# Patient Record
Sex: FEMALE | Race: WHITE | NOT HISPANIC OR LATINO | Employment: OTHER | URBAN - METROPOLITAN AREA
[De-identification: names, ages, dates, MRNs, and addresses within clinical notes are randomized per-mention and may not be internally consistent; named-entity substitution may affect disease eponyms.]

---

## 2017-06-01 ENCOUNTER — PROCEDURE VISIT (OUTPATIENT)
Dept: CARDIOLOGY | Facility: HOSPITAL | Age: 62
End: 2017-06-01

## 2017-06-01 ENCOUNTER — APPOINTMENT (OUTPATIENT)
Dept: LAB | Facility: HOSPITAL | Age: 62
End: 2017-06-01

## 2017-06-01 ENCOUNTER — OFFICE VISIT (OUTPATIENT)
Dept: CARDIOLOGY | Facility: HOSPITAL | Age: 62
End: 2017-06-01

## 2017-06-01 VITALS
WEIGHT: 173.4 LBS | HEIGHT: 65 IN | BODY MASS INDEX: 28.89 KG/M2 | SYSTOLIC BLOOD PRESSURE: 182 MMHG | HEART RATE: 79 BPM | OXYGEN SATURATION: 94 % | RESPIRATION RATE: 16 BRPM | DIASTOLIC BLOOD PRESSURE: 77 MMHG | TEMPERATURE: 98.4 F

## 2017-06-01 DIAGNOSIS — I48.0 PAROXYSMAL ATRIAL FIBRILLATION (HCC): Primary | ICD-10-CM

## 2017-06-01 DIAGNOSIS — I49.3 PVC (PREMATURE VENTRICULAR CONTRACTION): ICD-10-CM

## 2017-06-01 DIAGNOSIS — I49.9 IRREGULAR HEART RATE: ICD-10-CM

## 2017-06-01 DIAGNOSIS — R07.9 CHEST PAIN, UNSPECIFIED: ICD-10-CM

## 2017-06-01 DIAGNOSIS — R10.13 DYSPEPSIA: ICD-10-CM

## 2017-06-01 LAB
ALBUMIN SERPL-MCNC: 4.6 G/DL (ref 3.2–4.8)
ALBUMIN/GLOB SERPL: 1.5 G/DL (ref 1.5–2.5)
ALP SERPL-CCNC: 105 U/L (ref 25–100)
ALT SERPL W P-5'-P-CCNC: 22 U/L (ref 7–40)
ANION GAP SERPL CALCULATED.3IONS-SCNC: 4 MMOL/L (ref 3–11)
AST SERPL-CCNC: 21 U/L (ref 0–33)
BILIRUB SERPL-MCNC: 0.4 MG/DL (ref 0.3–1.2)
BUN BLD-MCNC: 14 MG/DL (ref 9–23)
BUN/CREAT SERPL: 17.5 (ref 7–25)
CALCIUM SPEC-SCNC: 10.2 MG/DL (ref 8.7–10.4)
CHLORIDE SERPL-SCNC: 108 MMOL/L (ref 99–109)
CO2 SERPL-SCNC: 29 MMOL/L (ref 20–31)
CREAT BLD-MCNC: 0.8 MG/DL (ref 0.6–1.3)
DEPRECATED RDW RBC AUTO: 41.2 FL (ref 37–54)
ERYTHROCYTE [DISTWIDTH] IN BLOOD BY AUTOMATED COUNT: 12.1 % (ref 11.3–14.5)
GFR SERPL CREATININE-BSD FRML MDRD: 72 ML/MIN/1.73
GLOBULIN UR ELPH-MCNC: 3.1 GM/DL
GLUCOSE BLD-MCNC: 93 MG/DL (ref 70–100)
HCT VFR BLD AUTO: 39.5 % (ref 34.5–44)
HGB BLD-MCNC: 13.3 G/DL (ref 11.5–15.5)
MAGNESIUM SERPL-MCNC: 2.5 MG/DL (ref 1.3–2.7)
MCH RBC QN AUTO: 31.4 PG (ref 27–31)
MCHC RBC AUTO-ENTMCNC: 33.7 G/DL (ref 32–36)
MCV RBC AUTO: 93.2 FL (ref 80–99)
PLATELET # BLD AUTO: 304 10*3/MM3 (ref 150–450)
PMV BLD AUTO: 10.9 FL (ref 6–12)
POTASSIUM BLD-SCNC: 4.7 MMOL/L (ref 3.5–5.5)
PROT SERPL-MCNC: 7.7 G/DL (ref 5.7–8.2)
RBC # BLD AUTO: 4.24 10*6/MM3 (ref 3.89–5.14)
SODIUM BLD-SCNC: 141 MMOL/L (ref 132–146)
T4 FREE SERPL-MCNC: 1.08 NG/DL (ref 0.89–1.76)
TSH SERPL DL<=0.05 MIU/L-ACNC: 2.91 MIU/ML (ref 0.35–5.35)
WBC NRBC COR # BLD: 8.92 10*3/MM3 (ref 3.5–10.8)

## 2017-06-01 PROCEDURE — 80053 COMPREHEN METABOLIC PANEL: CPT | Performed by: NURSE PRACTITIONER

## 2017-06-01 PROCEDURE — 36415 COLL VENOUS BLD VENIPUNCTURE: CPT | Performed by: NURSE PRACTITIONER

## 2017-06-01 PROCEDURE — 84443 ASSAY THYROID STIM HORMONE: CPT | Performed by: NURSE PRACTITIONER

## 2017-06-01 PROCEDURE — 84439 ASSAY OF FREE THYROXINE: CPT | Performed by: NURSE PRACTITIONER

## 2017-06-01 PROCEDURE — 85027 COMPLETE CBC AUTOMATED: CPT | Performed by: NURSE PRACTITIONER

## 2017-06-01 PROCEDURE — 83735 ASSAY OF MAGNESIUM: CPT | Performed by: NURSE PRACTITIONER

## 2017-06-01 PROCEDURE — 99204 OFFICE O/P NEW MOD 45 MIN: CPT | Performed by: NURSE PRACTITIONER

## 2017-06-01 PROCEDURE — 93010 ELECTROCARDIOGRAM REPORT: CPT | Performed by: INTERNAL MEDICINE

## 2017-06-01 PROCEDURE — 93005 ELECTROCARDIOGRAM TRACING: CPT

## 2017-06-01 RX ORDER — ESOMEPRAZOLE MAGNESIUM 40 MG/1
40 CAPSULE, DELAYED RELEASE ORAL DAILY PRN
COMMUNITY
End: 2018-10-18 | Stop reason: ALTCHOICE

## 2017-06-01 RX ORDER — PROPRANOLOL HYDROCHLORIDE 20 MG/1
20 TABLET ORAL DAILY
COMMUNITY
Start: 2017-05-20 | End: 2017-06-01

## 2017-06-01 RX ORDER — IBUPROFEN 800 MG/1
800 TABLET ORAL 2 TIMES DAILY PRN
COMMUNITY

## 2017-06-01 RX ORDER — ASPIRIN 81 MG/1
81 TABLET ORAL DAILY
COMMUNITY
End: 2017-06-01

## 2017-06-01 NOTE — PROGRESS NOTES
[facility]  [facility]      Encounter Date:06/01/2017     Muriel Rainey  [address] [facility] [address]  [phone]    1955    EARLENE Vega    Muriel Rainey is a 62 y.o. female.      Subjective:     Chief Complaint:  Palpitations (New dx afib with frequent PVCs) and Chest Pain       Palpitations    This is a new problem. The current episode started 1 to 4 weeks ago. The problem occurs daily. The problem has been gradually worsening. On average, each episode lasts 15 minutes. Nothing (occurs at rest and exertion) aggravates the symptoms. Associated symptoms include chest pain, an irregular heartbeat, malaise/fatigue and weakness. Pertinent negatives include no anxiety, coughing, diaphoresis, dizziness, fever, nausea, near-syncope, shortness of breath, syncope or vomiting. She has tried beta blockers for the symptoms. The treatment provided no relief. Risk factors include family history. Her past medical history is significant for hyperthyroidism. There is no history of anemia, anxiety, drug use, heart disease or a valve disorder.   Chest Pain    This is a new problem. The current episode started 1 to 4 weeks ago. The onset quality is undetermined. The problem occurs intermittently. The problem has been gradually worsening. The pain is present in the lateral region. The pain is mild. The quality of the pain is described as tightness. The pain radiates to the mid back and left neck. Associated symptoms include abdominal pain (worsening heartburn over the last 3 weeks.  ), irregular heartbeat, malaise/fatigue, palpitations and weakness. Pertinent negatives include no claudication, cough, diaphoresis, dizziness, fever, headaches, hemoptysis, nausea, near-syncope, orthopnea, PND, shortness of breath, sputum production, syncope or vomiting. The pain is aggravated by exertion and food. She has tried antacids for the symptoms. The treatment provided mild relief.   Her past medical  history is significant for arrhythmia (newly diagnosed).   Pertinent negatives for past medical history include no anxiety/panic attacks, no CAD, no COPD, no drug use, no DVT, no heart disease, no hypertension, no MI, no muscle weakness, no PE, no strokes, no TIA and no valve disorder.   Her family medical history is significant for CAD.        Problem   Chest Pain, Unspecified   Paroxysmal Atrial Fibrillation    · 24-hour Holter monitor 5/22/2017: Paroxysmal atrial fibrillation and frequent PVCs     Pvc (Premature Ventricular Contraction)   Dyspepsia       Past Surgical History:   Procedure Laterality Date   • CHOLECYSTECTOMY  11/25016   • ROTATOR CUFF REPAIR Right 2016       Allergies   Allergen Reactions   • Gabapentin Mental Status Change   • Statins Myalgia         Current Outpatient Prescriptions:   •  esomeprazole (nexIUM) 40 MG capsule, Take 40 mg by mouth Daily As Needed., Disp: , Rfl:   •  ibuprofen (ADVIL,MOTRIN) 800 MG tablet, Take 800 mg by mouth 2 (Two) Times a Day As Needed., Disp: , Rfl:   ASA 81 mg daily  . Propranolol 20 mg daily    The following portions of the patient's history were reviewed and updated as appropriate: allergies, current medications, past family history, past medical history, past social history, past surgical history and problem list.    Review of Systems   Constitution: Positive for weakness and malaise/fatigue. Negative for chills, decreased appetite, diaphoresis, fever, night sweats, weight gain and weight loss.   HENT: Negative for congestion, headaches and nosebleeds.    Eyes: Negative for blurred vision, visual disturbance and visual halos.   Cardiovascular: Positive for chest pain, irregular heartbeat and palpitations. Negative for claudication, cyanosis, dyspnea on exertion, leg swelling, near-syncope, orthopnea, paroxysmal nocturnal dyspnea and syncope.   Respiratory: Negative for cough, hemoptysis, shortness of breath, sleep disturbances due to breathing, snoring,  "sputum production and wheezing.    Endocrine: Negative for cold intolerance, heat intolerance, polydipsia, polyphagia and polyuria.   Hematologic/Lymphatic: Does not bruise/bleed easily.   Skin: Negative for dry skin, itching and rash.   Musculoskeletal: Positive for joint pain. Negative for falls, joint swelling, muscle weakness and myalgias.   Gastrointestinal: Positive for abdominal pain (worsening heartburn over the last 3 weeks.  ), diarrhea and heartburn. Negative for bloating, constipation, dysphagia, melena, nausea and vomiting.   Genitourinary: Positive for nocturia. Negative for dysuria, flank pain and hematuria.   Neurological: Positive for excessive daytime sleepiness. Negative for difficulty with concentration, dizziness and loss of balance.   Psychiatric/Behavioral: Negative for altered mental status and depression. The patient is not nervous/anxious.    Allergic/Immunologic: Negative for environmental allergies.       Objective:     Vitals:    06/01/17 1509 06/01/17 1516 06/01/17 1517   BP: 146/66 154/71 (!) 182/77   BP Location: Right arm Left arm Left arm   Patient Position: Sitting Sitting Standing   Pulse: 79     Resp: 16     Temp: 98.4 °F (36.9 °C)     TempSrc: Temporal Artery      SpO2: 94%     Weight: 173 lb 6.4 oz (78.7 kg)     Height: 65\" (165.1 cm)           Physical Exam   Constitutional: She is oriented to person, place, and time. She appears well-developed and well-nourished. No distress.   HENT:   Head: Normocephalic and atraumatic.   Mouth/Throat: Oropharynx is clear and moist.   Eyes: Conjunctivae are normal. Pupils are equal, round, and reactive to light. No scleral icterus.   Neck: No hepatojugular reflux and no JVD present. Carotid bruit is not present. No tracheal deviation present. No thyromegaly present.   Cardiovascular: Normal rate, normal heart sounds and intact distal pulses.  An irregularly irregular rhythm present. Exam reveals no friction rub.    No murmur " heard.  Pulmonary/Chest: Effort normal and breath sounds normal.   Abdominal: Soft. Bowel sounds are normal. She exhibits no distension. There is no tenderness.   Musculoskeletal: She exhibits no edema.   Lymphadenopathy:     She has no cervical adenopathy.   Neurological: She is alert and oriented to person, place, and time.   Skin: Skin is warm, dry and intact. No rash noted. No cyanosis or erythema. No pallor.   Psychiatric: She has a normal mood and affect. Her behavior is normal. Thought content normal.   Vitals reviewed.      Lab and Diagnostic Review:  Results for orders placed or performed in visit on 06/01/17   Comprehensive Metabolic Panel   Result Value Ref Range    Glucose 93 70 - 100 mg/dL    BUN 14 9 - 23 mg/dL    Creatinine 0.80 0.60 - 1.30 mg/dL    Sodium 141 132 - 146 mmol/L    Potassium 4.7 3.5 - 5.5 mmol/L    Chloride 108 99 - 109 mmol/L    CO2 29.0 20.0 - 31.0 mmol/L    Calcium 10.2 8.7 - 10.4 mg/dL    Total Protein 7.7 5.7 - 8.2 g/dL    Albumin 4.60 3.20 - 4.80 g/dL    ALT (SGPT) 22 7 - 40 U/L    AST (SGOT) 21 0 - 33 U/L    Alkaline Phosphatase 105 (H) 25 - 100 U/L    Total Bilirubin 0.4 0.3 - 1.2 mg/dL    eGFR Non African Amer 72 >60 mL/min/1.73    Globulin 3.1 gm/dL    A/G Ratio 1.5 1.5 - 2.5 g/dL    BUN/Creatinine Ratio 17.5 7.0 - 25.0    Anion Gap 4.0 3.0 - 11.0 mmol/L   CBC (No Diff)   Result Value Ref Range    WBC 8.92 3.50 - 10.80 10*3/mm3    RBC 4.24 3.89 - 5.14 10*6/mm3    Hemoglobin 13.3 11.5 - 15.5 g/dL    Hematocrit 39.5 34.5 - 44.0 %    MCV 93.2 80.0 - 99.0 fL    MCH 31.4 (H) 27.0 - 31.0 pg    MCHC 33.7 32.0 - 36.0 g/dL    RDW 12.1 11.3 - 14.5 %    RDW-SD 41.2 37.0 - 54.0 fl    MPV 10.9 6.0 - 12.0 fL    Platelets 304 150 - 450 10*3/mm3   TSH   Result Value Ref Range    TSH 2.913 0.350 - 5.350 mIU/mL   T4, Free   Result Value Ref Range    Free T4 1.08 0.89 - 1.76 ng/dL   Magnesium   Result Value Ref Range    Magnesium 2.5 1.3 - 2.7 mg/dL       Assessment and Plan:         1.  Paroxysmal atrial fibrillation    - ECG 12 Lead; Sinus rhythm with frequent PVCs, 76 bpm, bigeminal PVCs    Chadsvasc 1 (female)  - apixaban (ELIQUIS) 5 MG tablet tablet; Take 1 tablet by mouth 2 (Two) Times a Day.  Dispense: 60 tablet; Refill: 3    D/c propranolol and start:  - metoprolol tartrate (LOPRESSOR) 25 MG tablet; Take 1 tablet by mouth 2 (Two) Times a Day.  Dispense: 60 tablet; Refill: 3    - Adult Transthoracic Echo Complete; today, results pending    - Comprehensive Metabolic Panel  - CBC (No Diff)  - TSH  - T4, Free  - Magnesium    A. fib education completed: What is atrial fibrillation, causes, triggers, signs and symptoms, medication management (rate control versus rhythm control) and stroke prevention, procedural management and indications, and the role of the atrial fibrillation center and when to call.    2. PVC (premature ventricular contraction)  Bigeminal PVCs on EKG today.  Holter monitor showing frequent PVCs      3. Chest pain, unspecified  - Stress Test With Myocardial Perfusion (1 Day);   With AFib and PVC.  I want pt to continue BB, hold night dose prior to stress testing.  Lexiscan will be ordered.    4.  Dyspepsia:  Take Nexium daily  Cardiology referrral.    F[facility]u [facility], pending testing results and POC    *Please note that portions of this note were completed with a voice recognition program. Efforts were made to edit the dictations, but occasionally words are mistranscribed.

## 2017-06-02 ENCOUNTER — HOSPITAL ENCOUNTER (OUTPATIENT)
Dept: CARDIOLOGY | Facility: HOSPITAL | Age: 62
Discharge: HOME | End: 2017-06-02
Admitting: NURSE PRACTITIONER

## 2017-06-02 VITALS
HEIGHT: 65 IN | BODY MASS INDEX: 28.82 KG/M2 | WEIGHT: 173 LBS | SYSTOLIC BLOOD PRESSURE: 134 MMHG | DIASTOLIC BLOOD PRESSURE: 80 MMHG

## 2017-06-02 DIAGNOSIS — I48.0 PAROXYSMAL ATRIAL FIBRILLATION (HCC): ICD-10-CM

## 2017-06-02 DIAGNOSIS — I49.3 PVC (PREMATURE VENTRICULAR CONTRACTION): ICD-10-CM

## 2017-06-02 PROBLEM — R07.9 CHEST PAIN, UNSPECIFIED: Status: ACTIVE | Noted: 2017-06-02

## 2017-06-02 LAB
BH CV ECHO MEAS - AO MAX PG (FULL): 6.7 MMHG
BH CV ECHO MEAS - AO MAX PG: 9.9 MMHG
BH CV ECHO MEAS - AO MEAN PG (FULL): 3.5 MMHG
BH CV ECHO MEAS - AO MEAN PG: 5.3 MMHG
BH CV ECHO MEAS - AO ROOT AREA (BSA CORRECTED): 1.3
BH CV ECHO MEAS - AO ROOT AREA: 4.4 CM^2
BH CV ECHO MEAS - AO ROOT DIAM: 2.4 CM
BH CV ECHO MEAS - AO V2 MAX: 157.5 CM/SEC
BH CV ECHO MEAS - AO V2 MEAN: 107.4 CM/SEC
BH CV ECHO MEAS - AO V2 VTI: 38 CM
BH CV ECHO MEAS - AVA(I,A): 1.7 CM^2
BH CV ECHO MEAS - AVA(I,D): 1.7 CM^2
BH CV ECHO MEAS - AVA(V,A): 1.7 CM^2
BH CV ECHO MEAS - AVA(V,D): 1.7 CM^2
BH CV ECHO MEAS - BSA(HAYCOCK): 1.9 M^2
BH CV ECHO MEAS - BSA: 1.8 M^2
BH CV ECHO MEAS - BZI_BMI: 30.6 KILOGRAMS/M^2
BH CV ECHO MEAS - BZI_METRIC_HEIGHT: 160 CM
BH CV ECHO MEAS - BZI_METRIC_WEIGHT: 78.5 KG
BH CV ECHO MEAS - CONTRAST EF (2CH): 62.8 ML/M^2
BH CV ECHO MEAS - CONTRAST EF 4CH: 51.2 ML/M^2
BH CV ECHO MEAS - EDV(CUBED): 140.5 ML
BH CV ECHO MEAS - EDV(MOD-SP2): 78 ML
BH CV ECHO MEAS - EDV(MOD-SP4): 123 ML
BH CV ECHO MEAS - EDV(TEICH): 129.4 ML
BH CV ECHO MEAS - EF(CUBED): 78.4 %
BH CV ECHO MEAS - EF(MOD-SP2): 62.8 %
BH CV ECHO MEAS - EF(TEICH): 70.2 %
BH CV ECHO MEAS - ESV(CUBED): 30.4 ML
BH CV ECHO MEAS - ESV(MOD-SP2): 29 ML
BH CV ECHO MEAS - ESV(MOD-SP4): 60 ML
BH CV ECHO MEAS - ESV(TEICH): 38.6 ML
BH CV ECHO MEAS - FS: 40 %
BH CV ECHO MEAS - IVS/LVPW: 1
BH CV ECHO MEAS - IVSD: 0.77 CM
BH CV ECHO MEAS - LA DIMENSION: 3.5 CM
BH CV ECHO MEAS - LA/AO: 1.5
BH CV ECHO MEAS - LAT PEAK E' VEL: 5.6 CM/SEC
BH CV ECHO MEAS - LV DIASTOLIC VOL/BSA (35-75): 67.6 ML/M^2
BH CV ECHO MEAS - LV MASS(C)D: 138.3 GRAMS
BH CV ECHO MEAS - LV MASS(C)DI: 76.1 GRAMS/M^2
BH CV ECHO MEAS - LV MAX PG: 3.2 MMHG
BH CV ECHO MEAS - LV MEAN PG: 1.7 MMHG
BH CV ECHO MEAS - LV SYSTOLIC VOL/BSA (12-30): 33 ML/M^2
BH CV ECHO MEAS - LV V1 MAX: 89.5 CM/SEC
BH CV ECHO MEAS - LV V1 MEAN: 61 CM/SEC
BH CV ECHO MEAS - LV V1 VTI: 20.8 CM
BH CV ECHO MEAS - LVIDD: 5.2 CM
BH CV ECHO MEAS - LVIDS: 3.1 CM
BH CV ECHO MEAS - LVLD AP2: 7.2 CM
BH CV ECHO MEAS - LVLD AP4: 7.3 CM
BH CV ECHO MEAS - LVLS AP2: 5.4 CM
BH CV ECHO MEAS - LVLS AP4: 6.4 CM
BH CV ECHO MEAS - LVOT AREA (M): 3.1 CM^2
BH CV ECHO MEAS - LVOT AREA: 3.1 CM^2
BH CV ECHO MEAS - LVOT DIAM: 2 CM
BH CV ECHO MEAS - LVPWD: 0.77 CM
BH CV ECHO MEAS - MED PEAK E' VEL: 4.56 CM/SEC
BH CV ECHO MEAS - MV A MAX VEL: 95 CM/SEC
BH CV ECHO MEAS - MV DEC TIME: 0.24 SEC
BH CV ECHO MEAS - MV E MAX VEL: 88.8 CM/SEC
BH CV ECHO MEAS - MV E/A: 0.93
BH CV ECHO MEAS - PA ACC SLOPE: 391.9 CM/SEC^2
BH CV ECHO MEAS - PA ACC TIME: 0.16 SEC
BH CV ECHO MEAS - PA PR(ACCEL): 5.3 MMHG
BH CV ECHO MEAS - PULM DIAS VEL: 43.2 CM/SEC
BH CV ECHO MEAS - PULM S/D: 1.4
BH CV ECHO MEAS - PULM SYS VEL: 60.9 CM/SEC
BH CV ECHO MEAS - RVDD: 2.9 CM
BH CV ECHO MEAS - SI(AO): 92.2 ML/M^2
BH CV ECHO MEAS - SI(CUBED): 60.6 ML/M^2
BH CV ECHO MEAS - SI(LVOT): 35 ML/M^2
BH CV ECHO MEAS - SI(MOD-SP2): 26.9 ML/M^2
BH CV ECHO MEAS - SI(MOD-SP4): 34.6 ML/M^2
BH CV ECHO MEAS - SI(TEICH): 50 ML/M^2
BH CV ECHO MEAS - SV(AO): 167.7 ML
BH CV ECHO MEAS - SV(CUBED): 110.1 ML
BH CV ECHO MEAS - SV(LVOT): 63.7 ML
BH CV ECHO MEAS - SV(MOD-SP2): 49 ML
BH CV ECHO MEAS - SV(MOD-SP4): 63 ML
BH CV ECHO MEAS - SV(TEICH): 90.9 ML
BH CV ECHO MEAS - TAPSE (>1.6): 2.3 CM2
BH CV ECHO MEAS - TR MAX VEL: 200.4 CM/SEC
BH CV VAS BP LEFT ARM: NORMAL MMHG
BH CV XLRA - RV BASE: 3.5 CM
BH CV XLRA - RV LENGTH: 7.3 CM
BH CV XLRA - RV MID: 2.5 CM
BH CV XLRA - TDI S': 10.3 CM/SEC
E/E' RATIO: 17.2
LEFT ATRIUM VOLUME INDEX: 25.3 ML/M2
LV EF 2D ECHO EST: 55 %

## 2017-06-02 PROCEDURE — 93306 TTE W/DOPPLER COMPLETE: CPT | Performed by: INTERNAL MEDICINE

## 2017-06-02 PROCEDURE — 93306 TTE W/DOPPLER COMPLETE: CPT

## 2017-06-05 ENCOUNTER — DOCUMENTATION (OUTPATIENT)
Dept: CARDIOLOGY | Facility: HOSPITAL | Age: 62
End: 2017-06-05

## 2017-06-05 ENCOUNTER — HOSPITAL ENCOUNTER (OUTPATIENT)
Dept: CARDIOLOGY | Facility: HOSPITAL | Age: 62
Discharge: HOME | End: 2017-06-05

## 2017-06-05 ENCOUNTER — HOSPITAL ENCOUNTER (OUTPATIENT)
Dept: CARDIOLOGY | Facility: HOSPITAL | Age: 62
Discharge: HOME | End: 2017-06-05
Admitting: NURSE PRACTITIONER

## 2017-06-05 DIAGNOSIS — R07.9 CHEST PAIN, UNSPECIFIED: ICD-10-CM

## 2017-06-05 DIAGNOSIS — I48.0 PAROXYSMAL ATRIAL FIBRILLATION (HCC): ICD-10-CM

## 2017-06-05 DIAGNOSIS — I49.3 PVC (PREMATURE VENTRICULAR CONTRACTION): ICD-10-CM

## 2017-06-05 LAB
BH CV STRESS BP STAGE 2: NORMAL
BH CV STRESS BP STAGE 4: NORMAL
BH CV STRESS COMMENTS STAGE 1: NORMAL
BH CV STRESS DOSE REGADENOSON STAGE 1: 0.4
BH CV STRESS DURATION MIN STAGE 1: 1
BH CV STRESS DURATION MIN STAGE 2: 1
BH CV STRESS DURATION MIN STAGE 3: 1
BH CV STRESS DURATION MIN STAGE 4: 1
BH CV STRESS DURATION SEC STAGE 1: 0
BH CV STRESS DURATION SEC STAGE 2: 0
BH CV STRESS DURATION SEC STAGE 4: 0
BH CV STRESS HR STAGE 1: 103
BH CV STRESS HR STAGE 2: 120
BH CV STRESS HR STAGE 3: 110
BH CV STRESS HR STAGE 4: 110
BH CV STRESS PROTOCOL 1: NORMAL
BH CV STRESS RECOVERY BP: NORMAL MMHG
BH CV STRESS RECOVERY HR: 99 BPM
BH CV STRESS STAGE 1: 1
BH CV STRESS STAGE 2: 2
BH CV STRESS STAGE 3: 3
BH CV STRESS STAGE 4: 4
LV EF NUC BP: 58 %
MAXIMAL PREDICTED HEART RATE: 157 BPM
PERCENT MAX PREDICTED HR: 78.34 %
STRESS BASELINE BP: NORMAL MMHG
STRESS BASELINE HR: 90 BPM
STRESS PERCENT HR: 92 %
STRESS POST PEAK BP: NORMAL MMHG
STRESS POST PEAK HR: 123 BPM
STRESS TARGET HR: 133 BPM

## 2017-06-05 PROCEDURE — 78452 HT MUSCLE IMAGE SPECT MULT: CPT

## 2017-06-05 PROCEDURE — A9500 TC99M SESTAMIBI: HCPCS | Performed by: NURSE PRACTITIONER

## 2017-06-05 PROCEDURE — 93018 CV STRESS TEST I&R ONLY: CPT | Performed by: INTERNAL MEDICINE

## 2017-06-05 PROCEDURE — 78452 HT MUSCLE IMAGE SPECT MULT: CPT | Performed by: INTERNAL MEDICINE

## 2017-06-05 PROCEDURE — 0 TECHNETIUM SESTAMIBI: Performed by: NURSE PRACTITIONER

## 2017-06-05 PROCEDURE — 25010000002 REGADENOSON 0.4 MG/5ML SOLUTION: Performed by: NURSE PRACTITIONER

## 2017-06-05 PROCEDURE — 93017 CV STRESS TEST TRACING ONLY: CPT

## 2017-06-05 RX ADMIN — Medication 1 DOSE: at 11:40

## 2017-06-05 RX ADMIN — Medication 1 DOSE: at 13:45

## 2017-06-05 RX ADMIN — REGADENOSON 0.4 MG: 0.08 INJECTION, SOLUTION INTRAVENOUS at 13:44

## 2017-06-05 NOTE — PROGRESS NOTES
Received a PA request for apixaban 5 mg BID. Called insurance company and they sent PA form. Filled the form and it was signed EARLENE Barnes. Fax was sent to the insurance company.     Thank you,  Irma Moy PharmD.  6/5/2017  9:27 AM

## 2017-06-16 ENCOUNTER — CONSULT (OUTPATIENT)
Dept: CARDIOLOGY | Facility: CLINIC | Age: 62
End: 2017-06-16

## 2017-06-16 VITALS
WEIGHT: 175.6 LBS | HEIGHT: 65 IN | BODY MASS INDEX: 29.26 KG/M2 | HEART RATE: 54 BPM | SYSTOLIC BLOOD PRESSURE: 122 MMHG | DIASTOLIC BLOOD PRESSURE: 74 MMHG

## 2017-06-16 DIAGNOSIS — R07.89 ATYPICAL CHEST PAIN: Primary | ICD-10-CM

## 2017-06-16 DIAGNOSIS — I48.0 PAROXYSMAL ATRIAL FIBRILLATION (HCC): ICD-10-CM

## 2017-06-16 DIAGNOSIS — I49.3 PVC (PREMATURE VENTRICULAR CONTRACTION): ICD-10-CM

## 2017-06-16 PROCEDURE — 99204 OFFICE O/P NEW MOD 45 MIN: CPT | Performed by: INTERNAL MEDICINE

## 2017-06-16 RX ORDER — MELOXICAM 7.5 MG/1
7.5 TABLET ORAL 2 TIMES DAILY
COMMUNITY
End: 2018-10-18 | Stop reason: ALTCHOICE

## 2017-06-16 NOTE — PROGRESS NOTES
Encounter Date:06/16/2017    Patient ID: Muriel Rainey is a  62 y.o. female who resides in [place], [place].    CC/Reason for visit:  Shortness of Breath (Consult ); Irregular Heart Beat; and Palpitations          Muriel Rainey is a very sweet 62-year-old female referred to the office by EARLENE Mackenzie for evaluation of atrial fibrillation and palpitations.  The patient started having palpitations starting approximately one to 2 months ago.  The palpitations were irregularly occurring, not provoked or relieved with any maneuvers.  She saw her primary care physician and underwent a Holter monitor which demonstrated frequent PVCs and what was reported as short bursts of atrial fibrillation.  The patient was seen in the atrial fibrillation clinic and started on anticoagulation and metoprolol.  She states that the metoprolol is giving her an uneasy feeling in her chest.  She does feel that her palpitations have improved overall however.  She feels that the metoprolol causes some tightness in her chest which occurs at rest.  When she is active gardening in her yard, she has no anginal chest pain symptoms or palpitation symptoms.  She denies TIA or stroke symptoms.  She's had no syncope.    Review of Systems   Constitution: Positive for weight gain. Negative for weakness and malaise/fatigue.   HENT: Positive for headaches.    Eyes: Negative for vision loss in left eye and vision loss in right eye.   Cardiovascular: Positive for chest pain and palpitations. Negative for dyspnea on exertion, near-syncope, orthopnea, paroxysmal nocturnal dyspnea and syncope.   Musculoskeletal: Negative for myalgias.   Gastrointestinal: Positive for heartburn and vomiting. Negative for nausea.   Neurological: Negative for brief paralysis, excessive daytime sleepiness, focal weakness, numbness and paresthesias.   All other systems reviewed and are negative.      The patient's past medical, social, and family history reviewed in the  "patient's electronic medical record.    Allergies  Gabapentin and Statins    Outpatient Prescriptions Marked as Taking for the 6/16/17 encounter (Consult) with Nahid Vaughn MD   Medication Sig Dispense Refill   • esomeprazole (nexIUM) 40 MG capsule Take 40 mg by mouth Daily.     • ibuprofen (ADVIL,MOTRIN) 800 MG tablet Take 800 mg by mouth 2 (Two) Times a Day As Needed.     • meloxicam (MOBIC) 7.5 MG tablet Take 7.5 mg by mouth 2 (Two) Times a Day.     • metoprolol tartrate (LOPRESSOR) 25 MG tablet Take 1 tablet by mouth 2 (Two) Times a Day. 60 tablet 3   • [DISCONTINUED] apixaban (ELIQUIS) 5 MG tablet tablet Take 1 tablet by mouth 2 (Two) Times a Day. 60 tablet 3   • [DISCONTINUED] metoprolol tartrate (LOPRESSOR) 25 MG tablet Take 1 tablet by mouth 2 (Two) Times a Day. 60 tablet 3         Blood pressure 122/74, pulse 54, height 65\" (165.1 cm), weight 175 lb 9.6 oz (79.7 kg).  Body mass index is 29.22 kg/(m^2).    Physical Exam   Constitutional: She is oriented to person, place, and time. She appears well-developed and well-nourished.   HENT:   Head: Normocephalic and atraumatic.   Eyes: Pupils are equal, round, and reactive to light. No scleral icterus.   Neck: No JVD present. No thyromegaly present.   Cardiovascular: Normal rate and regular rhythm.  Exam reveals no gallop.    No murmur heard.  Pulmonary/Chest: Effort normal and breath sounds normal.   Abdominal: Soft. She exhibits no distension.   Musculoskeletal: She exhibits no edema.   Neurological: She is alert and oriented to person, place, and time.   Skin: Skin is warm and dry.   Psychiatric: She has a normal mood and affect. Her behavior is normal.       Data Review:     Lab Results   Component Value Date    TSH 2.913 06/01/2017     Lab Results   Component Value Date    WBC 8.92 06/01/2017    HGB 13.3 06/01/2017    HCT 39.5 06/01/2017    MCV 93.2 06/01/2017     06/01/2017     Lab Results   Component Value Date    GLUCOSE 93 06/01/2017 "    BUN 14 06/01/2017    CREATININE 0.80 06/01/2017    EGFRIFNONA 72 06/01/2017    BCR 17.5 06/01/2017    K 4.7 06/01/2017    CO2 29.0 06/01/2017    CALCIUM 10.2 06/01/2017    ALBUMIN 4.60 06/01/2017    LABIL2 1.5 06/01/2017    AST 21 06/01/2017    ALT 22 06/01/2017       Procedures    I reviewed the tracings from the 24 hour Holter monitor.  While the image quality is not great, I am not convinced that the short bursts of SVT are atrial fibrillation.       Problem List Items Addressed This Visit        Cardiovascular and Mediastinum    Paroxysmal atrial fibrillation    Overview     · 24-hour Holter monitor (5/22/2017): Paroxysmal atrial fibrillation and frequent PVCs  · Echo (6/2/2017): EF 55%. Mild TR.  · Pharmacologic nuclear stress (6/5/2017): EF 58%.  Normal perfusion.  Frequent PVCs noted.  · Chads VASC = 1         Current Assessment & Plan     I am not convinced that the patient has atrial fibrillation based on the scanned  Holter monitor results in epic.  Furthermore, she has a chads VASC score of 1 indicated aspirin monotherapy would be reasonable.  Suspect her palpitations are more related to the PVCs she was experiencing.  Her symptoms have improved and EKG today shows no PVCs.    · 14 day Holter monitor to evaluate for atrial fibrillation and evaluate PVC burden.  · Discontinue Eliquis and start aspirin 81 mg daily  · Continue metoprolol         Relevant Medications    aspirin 81 MG tablet    metoprolol tartrate (LOPRESSOR) 25 MG tablet    Other Relevant Orders    Holter / Event ZIO Patch    PVC (premature ventricular contraction)    Relevant Medications    metoprolol tartrate (LOPRESSOR) 25 MG tablet    Other Relevant Orders    Holter / Event ZIO Patch       Nervous and Auditory    Atypical chest pain - Primary    Relevant Medications    aspirin 81 MG tablet               · 14 day event monitor  · Discontinue Eliquis  · Start aspirin 81 mg daily  · Continue metoprolol  · Follow-up with me in 4  weeks    Nahid Vaughn MD  6/16/2017     Scribed for Nahid Vaughn MD by Jan Melton. 6/16/2017  5:06 PM    I, Nahid Vaughn MD, personally performed the services described in this documentation as scribed by the above named individual in my presence, and it is both accurate and complete.  6/16/2017  5:06 PM

## 2017-06-16 NOTE — ASSESSMENT & PLAN NOTE
I am not convinced that the patient has atrial fibrillation based on the scanned  Holter monitor results in epic.  Furthermore, she has a chads VASC score of 1 indicated aspirin monotherapy would be reasonable.  Suspect her palpitations are more related to the PVCs she was experiencing.  Her symptoms have improved.    · 14 day Holter monitor to evaluate for atrial fibrillation and evaluate PVC burden.  · Discontinue Eliquis and start aspirin 81 mg daily  · Continue metoprolol

## 2017-07-26 ENCOUNTER — OFFICE VISIT (OUTPATIENT)
Dept: CARDIOLOGY | Facility: CLINIC | Age: 62
End: 2017-07-26

## 2017-07-26 VITALS
SYSTOLIC BLOOD PRESSURE: 120 MMHG | HEART RATE: 54 BPM | HEIGHT: 65 IN | DIASTOLIC BLOOD PRESSURE: 84 MMHG | WEIGHT: 176.8 LBS | BODY MASS INDEX: 29.46 KG/M2

## 2017-07-26 DIAGNOSIS — I48.0 PAROXYSMAL ATRIAL FIBRILLATION (HCC): ICD-10-CM

## 2017-07-26 DIAGNOSIS — I49.3 PVC (PREMATURE VENTRICULAR CONTRACTION): Primary | ICD-10-CM

## 2017-07-26 DIAGNOSIS — R07.89 ATYPICAL CHEST PAIN: ICD-10-CM

## 2017-07-26 PROCEDURE — 99213 OFFICE O/P EST LOW 20 MIN: CPT | Performed by: NURSE PRACTITIONER

## 2017-07-26 RX ORDER — PREDNISONE 10 MG/1
10 TABLET ORAL DAILY
COMMUNITY
End: 2017-08-18

## 2017-07-26 RX ORDER — FLECAINIDE ACETATE 50 MG/1
50 TABLET ORAL EVERY 12 HOURS
Qty: 180 TABLET | Refills: 0 | Status: SHIPPED | OUTPATIENT
Start: 2017-07-26 | End: 2017-07-27 | Stop reason: ALTCHOICE

## 2017-07-26 NOTE — ASSESSMENT & PLAN NOTE
· Discontinue metoprolol and start flecainide 50 mg twice a day starting Saturday  · Repeat EKG at [facility] on Monday  · Continue aspirin 81 mg daily

## 2017-07-26 NOTE — PROGRESS NOTES
"Encounter Date:07/26/2017    Patient ID: Muriel Rainey is a 62 y.o. female who resides in [place], [place] but often stays in [place] with one of her 4 daughters.    CC/Reason for visit:  Shortness of Breath (Follow up ); Irregular Heart Beat; and Palpitations          Muriel Rainey returns today for follow-up of her palpitations area at her last visit.  His last visit she wore a 2 week event monitor which confirmed that she is not having atrial fibrillation but rather having frequent PVCs.  Her PVC burden was 19.6% of the time despite being on 25 twice a day of metoprolol.  She admits to feeling much better but it does still feel the palpitations.  She has experienced no more episodes of chest pain but does notice when she drinks her cup of coffee in the morning she will fill her heart \"skipped beats\".  She denies dyspnea, orthopnea or syncope.    Review of Systems   Cardiovascular: Positive for palpitations.       The patient's past medical, social, and family history reviewed in the patient's electronic medical record.    Allergies  Gabapentin and Statins    Outpatient Prescriptions Marked as Taking for the 7/26/17 encounter (Office Visit) with Nahid Vaughn MD   Medication Sig Dispense Refill   • aspirin 81 MG tablet Take 1 tablet by mouth Daily for 360 days. 90 tablet 3   • esomeprazole (nexIUM) 40 MG capsule Take 40 mg by mouth Daily.     • ibuprofen (ADVIL,MOTRIN) 800 MG tablet Take 800 mg by mouth 2 (Two) Times a Day As Needed.     • meloxicam (MOBIC) 7.5 MG tablet Take 7.5 mg by mouth 2 (Two) Times a Day.     • predniSONE (DELTASONE) 10 MG tablet Take 10 mg by mouth Daily.     • [DISCONTINUED] metoprolol tartrate (LOPRESSOR) 25 MG tablet Take 1 tablet by mouth 2 (Two) Times a Day. 60 tablet 3         Blood pressure 120/84, pulse 54, height 65\" (165.1 cm), weight 176 lb 12.8 oz (80.2 kg).  Body mass index is 29.42 kg/(m^2).    Physical Exam   Constitutional: She is oriented to person, place, and " time. She appears well-developed and well-nourished.   HENT:   Head: Normocephalic and atraumatic.   Eyes: Pupils are equal, round, and reactive to light. No scleral icterus.   Neck: No JVD present. Carotid bruit is not present. No thyromegaly present.   Cardiovascular: Normal rate, regular rhythm, S1 normal and S2 normal.  Exam reveals no gallop.    No murmur heard.  Pulmonary/Chest: Effort normal and breath sounds normal.   Abdominal: Soft. There is no tenderness.   Neurological: She is alert and oriented to person, place, and time.   Skin: Skin is warm and dry. No cyanosis. Nails show no clubbing.   Psychiatric: She has a normal mood and affect. Her behavior is normal.       Data Review:   Procedures         Problem List Items Addressed This Visit        Cardiovascular and Mediastinum    Paroxysmal atrial fibrillation    Overview     · 24-hour Holter monitor (5/22/2017): Paroxysmal atrial fibrillation and frequent PVCs  · Echo (6/2/2017): EF 55%. Mild TR.  · Pharmacologic nuclear stress (6/5/2017): EF 58%.  Normal perfusion.  Frequent PVCs noted.  · Chads VASC = 1         Current Assessment & Plan     · No atrial fibrillation was detected on 2 week monitor         PVC (premature ventricular contraction) - Primary    Overview     · ZIO (07/03/2017): Predominantly normal sinus rhythm with average heart rate 74.  Less than 1% of PAC burden.  19.6% PVC burden.         Current Assessment & Plan     · Discontinue metoprolol and start flecainide 50 mg twice a day starting Saturday  · Repeat E[facility] at [facility] on Monday  · Continue aspirin 81 mg daily         Relevant Medications    flecainide (TAMBOCOR) 50 MG tablet    Other Relevant Orders    ECG 12 Lead       Nervous and Auditory    Atypical chest pain    Overview     · Patient asymptomatic and reports no further symptoms.             Mrs. Rainey is overall doing well however she still has a fairly high PVC burden.  I will have her discontinue her metoprolol and  start flecainide 50 mg twice a day with a repeat EKG in 48 hours.  I'll schedule her to follow-up in 3 months or sooner if needed         · Discontinue metoprolol as of Friday night.  · Start flecainide 50 mg twice a day on Saturday.  · Follow-up the [facility] for a repeat EKG on Monday  · Follow-up at [facility] in 3 months or sooner if needed.    Bridgette HENAO evaluated just patient independently    EARLENE Nina  7/26/2017

## 2017-07-27 ENCOUNTER — TELEPHONE (OUTPATIENT)
Dept: CARDIOLOGY | Facility: CLINIC | Age: 62
End: 2017-07-27

## 2017-07-27 RX ORDER — SOTALOL HYDROCHLORIDE 80 MG/1
40 TABLET ORAL 2 TIMES DAILY
Qty: 45 TABLET | Refills: 1 | Status: SHIPPED | OUTPATIENT
Start: 2017-07-27 | End: 2017-10-16 | Stop reason: SDUPTHER

## 2017-07-27 NOTE — TELEPHONE ENCOUNTER
Patient called saying the flecainide started yesterday was too expensive. She said you told her that she could switch to something else if that was the case. What would you like to switch her to?

## 2017-07-27 NOTE — TELEPHONE ENCOUNTER
You can try Sotalol.  Make sure she stops the metoprolol and starts the sotalol on Saturday with EKG on MOdnay.  Same plan just different zach

## 2017-07-31 ENCOUNTER — TELEPHONE (OUTPATIENT)
Dept: CARDIOLOGY | Facility: HOSPITAL | Age: 62
End: 2017-07-31

## 2017-07-31 ENCOUNTER — HOSPITAL ENCOUNTER (OUTPATIENT)
Dept: CARDIOLOGY | Facility: HOSPITAL | Age: 62
Discharge: HOME | End: 2017-07-31

## 2017-07-31 DIAGNOSIS — I49.3 FREQUENT PVCS: Primary | ICD-10-CM

## 2017-07-31 DIAGNOSIS — I49.3 FREQUENT PVCS: ICD-10-CM

## 2017-07-31 PROCEDURE — 93005 ELECTROCARDIOGRAM TRACING: CPT | Performed by: NURSE PRACTITIONER

## 2017-07-31 PROCEDURE — 93010 ELECTROCARDIOGRAM REPORT: CPT | Performed by: INTERNAL MEDICINE

## 2017-08-18 ENCOUNTER — OFFICE VISIT (OUTPATIENT)
Dept: CARDIOLOGY | Facility: CLINIC | Age: 62
End: 2017-08-18

## 2017-08-18 VITALS
HEIGHT: 65 IN | WEIGHT: 174.4 LBS | DIASTOLIC BLOOD PRESSURE: 82 MMHG | SYSTOLIC BLOOD PRESSURE: 126 MMHG | BODY MASS INDEX: 29.06 KG/M2 | HEART RATE: 62 BPM

## 2017-08-18 DIAGNOSIS — I49.3 PVC (PREMATURE VENTRICULAR CONTRACTION): ICD-10-CM

## 2017-08-18 DIAGNOSIS — I48.0 PAROXYSMAL ATRIAL FIBRILLATION (HCC): Primary | ICD-10-CM

## 2017-08-18 PROCEDURE — 99214 OFFICE O/P EST MOD 30 MIN: CPT | Performed by: INTERNAL MEDICINE

## 2017-08-18 PROCEDURE — 93000 ELECTROCARDIOGRAM COMPLETE: CPT | Performed by: INTERNAL MEDICINE

## 2017-08-18 NOTE — PROGRESS NOTES
Encounter Date:08/18/2017    Patient ID: Muriel Rainey is a  62 y.o. female who resides in [place], [place].    CC/Reason for visit:  PVC and Surgical Clearance (For Knee replacement )          Muriel Rainey returns to the office today in follow-up of her symptomatic PVCs, atrial fibrillation, and for preoperative cardiac risk assessment.  The patient plans to undergo partial knee replacement with Dr. Donovan on September 8.  She denies any exertional symptoms to suggest angina.  She does continue to have palpitation symptoms.  Initially, flecainide was recommended but was too expensive.  She's now been started on sotalol and has had some symptomatic improvement.  She denies orthopnea, PND, or exertional shortness of breath to suggest heart failure.  She denies TIA or stroke symptoms.      Review of Systems   Constitution: Negative for weakness and malaise/fatigue.   Eyes: Negative for vision loss in left eye and vision loss in right eye.   Cardiovascular: Negative for chest pain, dyspnea on exertion, near-syncope, orthopnea, palpitations, paroxysmal nocturnal dyspnea and syncope.   Musculoskeletal: Negative for myalgias.   Neurological: Negative for brief paralysis, excessive daytime sleepiness, focal weakness, numbness and paresthesias.   All other systems reviewed and are negative.      The patient's past medical, social, family history and ROS reviewed in the patient's electronic medical record.    Allergies  Gabapentin and Statins    Outpatient Prescriptions Marked as Taking for the 8/18/17 encounter (Office Visit) with Nahid Vaughn MD   Medication Sig Dispense Refill   • aspirin 81 MG tablet Take 1 tablet by mouth Daily for 360 days. 90 tablet 3   • esomeprazole (nexIUM) 40 MG capsule Take 40 mg by mouth Daily As Needed.     • ibuprofen (ADVIL,MOTRIN) 800 MG tablet Take 800 mg by mouth 2 (Two) Times a Day As Needed.     • meloxicam (MOBIC) 7.5 MG tablet Take 7.5 mg by mouth 2 (Two) Times a Day.     •  "sotalol (BETAPACE) 80 MG tablet Take 0.5 tablets by mouth 2 (Two) Times a Day. 45 tablet 1         Blood pressure 126/82, pulse 62, height 65\" (165.1 cm), weight 174 lb 6.4 oz (79.1 kg).  Body mass index is 29.02 kg/(m^2).    Physical Exam   Constitutional: She is oriented to person, place, and time. She appears well-developed and well-nourished.   HENT:   Head: Normocephalic and atraumatic.   Eyes: Pupils are equal, round, and reactive to light. No scleral icterus.   Neck: No JVD present. Carotid bruit is not present. No thyromegaly present.   Cardiovascular: Normal rate, regular rhythm, S1 normal and S2 normal.  Exam reveals no gallop.    No murmur heard.  Pulmonary/Chest: Effort normal and breath sounds normal.   Abdominal: Soft. There is no hepatosplenomegaly. There is no tenderness.   Neurological: She is alert and oriented to person, place, and time.   Skin: Skin is warm and dry. No cyanosis. Nails show no clubbing.   Psychiatric: She has a normal mood and affect. Her behavior is normal.       Data Review:     Lab Results   Component Value Date    TSH 2.913 06/01/2017     Lab Results   Component Value Date    GLUCOSE 93 06/01/2017    BUN 14 06/01/2017    CREATININE 0.80 06/01/2017    EGFRIFNONA 72 06/01/2017    BCR 17.5 06/01/2017    K 4.7 06/01/2017    CO2 29.0 06/01/2017    CALCIUM 10.2 06/01/2017    ALBUMIN 4.60 06/01/2017    LABIL2 1.5 06/01/2017    AST 21 06/01/2017    ALT 22 06/01/2017     Lab Results   Component Value Date    WBC 8.92 06/01/2017    HGB 13.3 06/01/2017    HCT 39.5 06/01/2017    MCV 93.2 06/01/2017     06/01/2017       ECG 12 Lead  Date/Time: 8/18/2017 5:48 PM  Performed by: SHAHEED BOWEN  Authorized by: SHAHEED BOWEN   Comparison: not compared with previous ECG   Rhythm: sinus rhythm  BPM: 60  Conduction: LAFB  T depression: III  Other findings: PRWP  Clinical impression: non-specific ECG          Echo (6/2/17): Normal LVEF.    Pharmacologic nuclear stress " (6/5/17): Normal perfusion, normal LVEF.       Problem List Items Addressed This Visit        Cardiovascular and Mediastinum    Paroxysmal atrial fibrillation - Primary    Overview     · 24-hour Holter monitor (5/22/2017): Paroxysmal atrial fibrillation and frequent PVCs  · Echo (6/2/2017): EF 55%. Mild TR.  · Pharmacologic nuclear stress (6/5/2017): EF 58%.  Normal perfusion.  Frequent PVCs noted.  · Chads VASC = 1         PVC (premature ventricular contraction)    Overview     · ZIO (07/03/2017): Predominantly normal sinus rhythm with average heart rate 74.  Less than 1% of PAC burden.  19.6% PVC burden.                    · Proceed with partial knee arthroplasty.  · Continue sotalol for PVCs/A. fib  · Return in about 6 months (around 2/18/2018).      Nahid Vaughn MD     Scribed for Nahid Vaughn MD by Jan Melton. 8/18/2017  3:06 PM    8/18/2017

## 2017-10-16 RX ORDER — SOTALOL HYDROCHLORIDE 80 MG/1
40 TABLET ORAL 2 TIMES DAILY
Qty: 90 TABLET | Refills: 1 | Status: SHIPPED | OUTPATIENT
Start: 2017-10-16 | End: 2018-04-05 | Stop reason: SDUPTHER

## 2018-01-25 ENCOUNTER — TELEPHONE (OUTPATIENT)
Dept: CARDIOLOGY | Facility: CLINIC | Age: 63
End: 2018-01-25

## 2018-01-25 NOTE — TELEPHONE ENCOUNTER
Patient called requesting a call back. I have made multiple attempts to call the patient back and left messages. Told to return call to discuss concerns.

## 2018-02-23 ENCOUNTER — OFFICE VISIT (OUTPATIENT)
Dept: CARDIOLOGY | Facility: CLINIC | Age: 63
End: 2018-02-23

## 2018-02-23 VITALS
DIASTOLIC BLOOD PRESSURE: 82 MMHG | BODY MASS INDEX: 30.79 KG/M2 | HEIGHT: 65 IN | WEIGHT: 184.8 LBS | SYSTOLIC BLOOD PRESSURE: 132 MMHG | HEART RATE: 64 BPM

## 2018-02-23 DIAGNOSIS — I49.3 PVC (PREMATURE VENTRICULAR CONTRACTION): ICD-10-CM

## 2018-02-23 DIAGNOSIS — I48.0 PAROXYSMAL ATRIAL FIBRILLATION (HCC): Primary | ICD-10-CM

## 2018-02-23 PROCEDURE — 99214 OFFICE O/P EST MOD 30 MIN: CPT | Performed by: NURSE PRACTITIONER

## 2018-02-23 PROCEDURE — 93000 ELECTROCARDIOGRAM COMPLETE: CPT | Performed by: NURSE PRACTITIONER

## 2018-02-23 NOTE — ASSESSMENT & PLAN NOTE
· Continue sotalol 40 mg twice a day  · Continue aspirin 81 mg daily  · Defer anticoagulation due to low chads vascular score

## 2018-02-23 NOTE — PROGRESS NOTES
Encounter Date:02/23/2018    Patient ID: Muriel Rainey is a 62 y.o. female who resides in [place], [place]    CC/Reason for visit:  Irregular Heart Beat (6 month follow up) and Palpitations            Muriel Rainey returns today for follow-up of her paroxysmal atrial fibrillation and PVCs as well as hypertension.  Since her last visit the patient underwent partial knee replacement in September.  She has recovered well.  She is on sotalol 40 mg twice a day for her PVCs and paroxysmal atrial fibrillation.  Initially when she was started on sotalol it resolved all of her palpitations however she has started to feel fluttering/racing in her chest that occurs one to 2 times per week.  The episodes are relatively short-lived that she does feel somewhat lightheaded when they occur.  She also feels as if she cannot take a good breath when it happens.  She is not anticoagulated due to low chads vascular score of 1.  She denies signs or symptoms of a TIA or stroke.  Her blood pressures have stayed well controlled.  She does take a daily 81 mg aspirin.    Review of Systems   Cardiovascular: Positive for irregular heartbeat and palpitations.   Musculoskeletal: Positive for back pain.   Neurological: Positive for headaches.       The patient's past medical, social, family history and ROS reviewed in the patient's electronic medical record.    Allergies  Flecainide; Gabapentin; and Statins    Outpatient Prescriptions Marked as Taking for the 2/23/18 encounter (Office Visit) with Nahid Vaughn IV, MD   Medication Sig Dispense Refill   • aspirin 81 MG tablet Take 1 tablet by mouth Daily for 360 days. 90 tablet 3   • esomeprazole (nexIUM) 40 MG capsule Take 40 mg by mouth Daily As Needed.     • ibuprofen (ADVIL,MOTRIN) 800 MG tablet Take 800 mg by mouth 2 (Two) Times a Day As Needed.     • meloxicam (MOBIC) 7.5 MG tablet Take 7.5 mg by mouth 2 (Two) Times a Day.     • sotalol (BETAPACE) 80 MG tablet Take 0.5 tablets by  "mouth 2 (Two) Times a Day. 90 tablet 1         Blood pressure 132/82, pulse 64, height 165.1 cm (65\"), weight 83.8 kg (184 lb 12.8 oz).  Body mass index is 30.75 kg/(m^2).    Physical Exam   Constitutional: She is oriented to person, place, and time. She appears well-developed and well-nourished.   HENT:   Head: Normocephalic and atraumatic.   Eyes: Pupils are equal, round, and reactive to light. No scleral icterus.   Neck: No JVD present. Carotid bruit is not present. No thyromegaly present.   Cardiovascular: Normal rate, regular rhythm, S1 normal and S2 normal.  Exam reveals no gallop.    No murmur heard.  Pulmonary/Chest: Effort normal and breath sounds normal.   Abdominal: Soft. There is no hepatosplenomegaly. There is no tenderness.   Neurological: She is alert and oriented to person, place, and time.   Skin: Skin is warm and dry. No cyanosis. Nails show no clubbing.   Psychiatric: She has a normal mood and affect. Her behavior is normal.       Data Review:     ECG 12 Lead  Date/Time: 2/23/2018 2:39 PM  Performed by: WILDA ENGEL  Authorized by: WILDA ENGEL   Rhythm: sinus rhythm  Ectopy: infrequent PVCs  BPM: 64  Clinical impression: abnormal ECG  Comments: Sinus rhythm with occasional PVCs  QRS duration 88 MS  QT/QTc 416/429 MS               Problem List Items Addressed This Visit        Cardiovascular and Mediastinum    Paroxysmal atrial fibrillation - Primary    Overview     · 24-hour Holter monitor (5/22/2017): Paroxysmal atrial fibrillation and frequent PVCs  · Echo (6/2/2017): EF 55%. Mild TR.  · Pharmacologic nuclear stress (6/5/2017): EF 58%.  Normal perfusion.  Frequent PVCs noted.  · Chads VASC = 1         Current Assessment & Plan     · Continue sotalol 40 mg twice a day  · Continue aspirin 81 mg daily  · Defer anticoagulation due to low chads vascular score         PVC (premature ventricular contraction)    Overview     · ZIO (07/03/2017): Predominantly normal sinus rhythm with average " heart rate 74.  Less than 1% of PAC burden.  19.6% PVC burden.         Current Assessment & Plan     · Controlled with sotalol             The patient has started to experience palpitations once again.  We will place a 2 week monitor today to quantify her burden to PVCs and assess for episodes of atrial fibrillation to help guide medical therapy.  If she is having a fairly high burden of PVCs or frequent atrial fibrillation I will increase her sotalol.  Follow-up 6 weeks to discuss results and further recommendations       · 2 week monitor today  · Depending on burden of PVCs and/or episodes that fibrillation can consider increasing sotalol  · Follow-up 6 weeks to discuss results and make further recommendations    Edith Fiore, RN  2/23/2018

## 2018-02-23 NOTE — ASSESSMENT & PLAN NOTE
· Obtain ZIO to quantify burden of PVCs to determine effectiveness of medical therapy  · Follow-up 6 weeks for reassessment

## 2018-04-05 RX ORDER — SOTALOL HYDROCHLORIDE 80 MG/1
40 TABLET ORAL 2 TIMES DAILY
Qty: 45 TABLET | Refills: 3 | Status: SHIPPED | OUTPATIENT
Start: 2018-04-05 | End: 2018-09-26

## 2018-07-23 ENCOUNTER — TELEPHONE (OUTPATIENT)
Dept: CARDIOLOGY | Facility: CLINIC | Age: 63
End: 2018-07-23

## 2018-07-23 DIAGNOSIS — I10 ESSENTIAL HYPERTENSION: Primary | ICD-10-CM

## 2018-07-23 NOTE — TELEPHONE ENCOUNTER
Stop Lisinopril and start Losartan 50 mg daily and hctz 25 mg daily.  BMP in 2 weeks.  Check BP and report back to us

## 2018-07-23 NOTE — TELEPHONE ENCOUNTER
"Patient's daughter called to report her increased BP's. Today at her doctor's appointment, it was 200/100, unsure of HR. She does not check it at home. She will check it tonight and in the AM and will have results for me in the AM. She had hernia surgery 2 months ago (lisinopril 10 mg was initiated at that time). She did take an extra lisinopril today and \"thinks\" her BP decreased. She is also complaining of cough.     On lisinopril 10 mg daily and sotalol 40 mg BID.  Could she switch to something else?  "

## 2018-07-24 RX ORDER — HYDROCHLOROTHIAZIDE 25 MG/1
25 TABLET ORAL DAILY
Qty: 90 TABLET | Refills: 0 | Status: SHIPPED | OUTPATIENT
Start: 2018-07-24 | End: 2018-09-26 | Stop reason: SDUPTHER

## 2018-07-24 RX ORDER — LOSARTAN POTASSIUM 50 MG/1
50 TABLET ORAL DAILY
Qty: 90 TABLET | Refills: 0 | Status: SHIPPED | OUTPATIENT
Start: 2018-07-24 | End: 2018-09-26 | Stop reason: SDUPTHER

## 2018-07-24 NOTE — TELEPHONE ENCOUNTER
I spoke with the daughter and she verbalized understanding. Lab orders mailed and scripts sent to [facility], per her request. Will keep BP and HR log and update us with results.

## 2018-09-25 NOTE — PROGRESS NOTES
Encounter Date:09/26/2018    Patient ID: Muriel Rainey is a 63 y.o. female who resides in [place], [place].     CC/Reason for visit:  Irregular Heart Beat and Palpitations            Muriel Rainey returns to the office today for a 6 month follow-up of her paroxysmal atrial fibrillation and PVCs, as well as hypertension. The patient's been doing well overall. She had a cut back on her sotalol as it made her very tired. She is presently taking one half of an 80 mg tablet at night. She denies palpitations or shortness of breath. She's had no TIA or stroke symptoms. She feels better since starting hydrochlorothiazide. She has not been taking aspirin every day as she is been needing to take ibuprofen for back pain frequently.    Review of Systems   Constitution: Negative for weakness and malaise/fatigue.   Eyes: Negative for vision loss in left eye and vision loss in right eye.   Cardiovascular: Negative for chest pain, dyspnea on exertion, near-syncope, orthopnea, palpitations, paroxysmal nocturnal dyspnea and syncope.   Musculoskeletal: Negative for myalgias.   Neurological: Negative for brief paralysis, excessive daytime sleepiness, focal weakness, numbness and paresthesias.   All other systems reviewed and are negative.      The patient's past medical, social, family history and ROS reviewed in the patient's electronic medical record.    Allergies  Flecainide; Gabapentin; and Statins    Outpatient Prescriptions Marked as Taking for the 9/26/18 encounter (Office Visit) with Nahid Vaughn IV, MD   Medication Sig Dispense Refill   • aspirin 81 MG tablet Take 1 tablet by mouth Daily for 360 days. 90 tablet 3   • esomeprazole (nexIUM) 40 MG capsule Take 40 mg by mouth Daily As Needed.     • hydrochlorothiazide (HYDRODIURIL) 25 MG tablet Take 1 tablet by mouth Daily for 360 days. 90 tablet 3   • ibuprofen (ADVIL,MOTRIN) 800 MG tablet Take 800 mg by mouth 2 (Two) Times a Day As Needed.     • losartan (COZAAR) 50  "MG tablet Take 1 tablet by mouth Daily for 360 days. 90 tablet 3   • meloxicam (MOBIC) 7.5 MG tablet Take 7.5 mg by mouth 2 (Two) Times a Day.     • [DISCONTINUED] hydrochlorothiazide (HYDRODIURIL) 25 MG tablet Take 1 tablet by mouth Daily. 90 tablet 0   • [DISCONTINUED] losartan (COZAAR) 50 MG tablet Take 1 tablet by mouth Daily. 90 tablet 0   • [DISCONTINUED] sotalol (BETAPACE) 80 MG tablet Take 0.5 tablets by mouth 2 (Two) Times a Day. (Patient taking differently: Take 40 mg by mouth Daily.) 45 tablet 3           Blood pressure 112/82, pulse 58, height 165.1 cm (65\"), weight 75.7 kg (166 lb 12.8 oz).  Body mass index is 27.76 kg/m².  There were no vitals filed for this visit.    Physical Exam   Constitutional: She is oriented to person, place, and time. She appears well-developed and well-nourished.   HENT:   Head: Normocephalic and atraumatic.   Eyes: Pupils are equal, round, and reactive to light. No scleral icterus.   Neck: No JVD present. Carotid bruit is not present. No thyromegaly present.   Cardiovascular: Normal rate and regular rhythm.  Exam reveals no gallop.    No murmur heard.  Pulmonary/Chest: Effort normal and breath sounds normal.   Abdominal: Soft. She exhibits no distension. There is no hepatosplenomegaly.   Musculoskeletal: She exhibits no edema.   Neurological: She is alert and oriented to person, place, and time.   Skin: Skin is warm and dry.   Psychiatric: She has a normal mood and affect. Her behavior is normal.       Data Review:       ECG 12 Lead  Date/Time: 9/26/2018 10:31 AM  Performed by: SHAHEED BOWEN IV  Authorized by: SHAHEED BOWEN IV   Comparison: compared with previous ECG from 2/23/2018  Similar to previous ECG  Rhythm: sinus rhythm  Ectopy: infrequent PVCs  BPM: 58  Conduction: LAFB  Comments: Poor R-wave progression  QTc interval 409 ms            Lab Results   Component Value Date    AST 21 06/01/2017    ALT 22 06/01/2017     Lab Results   Component " Value Date    GLUCOSE 93 06/01/2017    BUN 14 06/01/2017    CREATININE 0.80 06/01/2017    EGFRIFNONA 72 06/01/2017    BCR 17.5 06/01/2017    K 4.7 06/01/2017    CO2 29.0 06/01/2017    CALCIUM 10.2 06/01/2017    ALBUMIN 4.60 06/01/2017    AST 21 06/01/2017    ALT 22 06/01/2017     Lab Results   Component Value Date    WBC 8.92 06/01/2017    HGB 13.3 06/01/2017    HCT 39.5 06/01/2017    MCV 93.2 06/01/2017     06/01/2017     Lab Results   Component Value Date    TSH 2.913 06/01/2017              Problem List Items Addressed This Visit        Cardiovascular and Mediastinum    Paroxysmal atrial fibrillation (CMS/HCC) - Primary    Overview     · 24-hour Holter monitor (5/22/2017): Short episodes of atrial fibrillation  · 72 hour Holter (6/2017): Sinus rhythm with frequent PVCs (PVC burden 19.6%)  · Echo (6/2/2017): EF 55%. Mild TR.  · Pharmacologic nuclear stress (6/5/2017): EF 58%.  Normal perfusion.  Frequent PVCs noted.  · 7 day Holter (2/2018): Sinus rhythm with average heart rate 71 bpm. Short episodes of atrial tachycardia. Occasional PVCs. No atrial fibrillation.  · Chads VASC  2 (female, HTN)         Current Assessment & Plan     · Asymptomatic  · Discontinue sotalol due to fatigue  · If patient develops recurrent PVCs or atrial fibrillation, will need to resume sotalol  · I do not believe the patient needs anticoagulation presently as she's only had limited episodes of atrial fibrillation and to subsequence Holter monitors showing no evidence of atrial fibrillation.         Essential hypertension    Current Assessment & Plan     · Controlled  · Continue present medical therapy         Relevant Medications    hydrochlorothiazide (HYDRODIURIL) 25 MG tablet    losartan (COZAAR) 50 MG tablet    PVC (premature ventricular contraction)    Overview     · ZIO (07/03/2017): Predominantly normal sinus rhythm with average heart rate 74.  Less than 1% of PAC burden.  19.6% PVC burden.         Current Assessment &  Plan     · Presently asymptomatic                    · Discontinue sotalol altogether  · Take aspirin 81 mg daily or 3 times weekly as tolerated. No anticoagulation given limited atrial fibrillation  · Continue losartan and hydrochlorothiazide for blood pressure  · Follow-up with me in 6 months    Scribed for Nahid Vaughn IV, MD by Bea Fernandez. 9/26/2018  10:32 AM

## 2018-09-26 ENCOUNTER — OFFICE VISIT (OUTPATIENT)
Dept: CARDIOLOGY | Facility: CLINIC | Age: 63
End: 2018-09-26

## 2018-09-26 VITALS
WEIGHT: 166.8 LBS | HEIGHT: 65 IN | SYSTOLIC BLOOD PRESSURE: 112 MMHG | DIASTOLIC BLOOD PRESSURE: 82 MMHG | BODY MASS INDEX: 27.79 KG/M2 | HEART RATE: 58 BPM

## 2018-09-26 DIAGNOSIS — I10 ESSENTIAL HYPERTENSION: ICD-10-CM

## 2018-09-26 DIAGNOSIS — I49.3 PVC (PREMATURE VENTRICULAR CONTRACTION): ICD-10-CM

## 2018-09-26 DIAGNOSIS — I48.0 PAROXYSMAL ATRIAL FIBRILLATION (HCC): Primary | ICD-10-CM

## 2018-09-26 PROCEDURE — 99214 OFFICE O/P EST MOD 30 MIN: CPT | Performed by: INTERNAL MEDICINE

## 2018-09-26 PROCEDURE — 93000 ELECTROCARDIOGRAM COMPLETE: CPT | Performed by: INTERNAL MEDICINE

## 2018-09-26 RX ORDER — LOSARTAN POTASSIUM 50 MG/1
50 TABLET ORAL DAILY
Qty: 90 TABLET | Refills: 3 | Status: SHIPPED | OUTPATIENT
Start: 2018-09-26 | End: 2019-09-21

## 2018-09-26 RX ORDER — HYDROCHLOROTHIAZIDE 25 MG/1
25 TABLET ORAL DAILY
Qty: 90 TABLET | Refills: 3 | Status: SHIPPED | OUTPATIENT
Start: 2018-09-26 | End: 2019-09-21

## 2018-09-26 NOTE — ASSESSMENT & PLAN NOTE
· Asymptomatic  · Discontinue sotalol due to fatigue  · If patient develops recurrent PVCs or atrial fibrillation, will need to resume sotalol  · I do not believe the patient needs anticoagulation presently as she's only had limited episodes of atrial fibrillation and to subsequence Holter monitors showing no evidence of atrial fibrillation.

## 2018-10-18 ENCOUNTER — OFFICE VISIT (OUTPATIENT)
Dept: NEUROSURGERY | Facility: CLINIC | Age: 63
End: 2018-10-18

## 2018-10-18 VITALS
TEMPERATURE: 98.5 F | OXYGEN SATURATION: 98 % | WEIGHT: 171 LBS | SYSTOLIC BLOOD PRESSURE: 126 MMHG | HEIGHT: 65 IN | DIASTOLIC BLOOD PRESSURE: 68 MMHG | BODY MASS INDEX: 28.49 KG/M2 | RESPIRATION RATE: 18 BRPM | HEART RATE: 72 BPM

## 2018-10-18 DIAGNOSIS — M51.36 DEGENERATIVE DISC DISEASE, LUMBAR: Primary | ICD-10-CM

## 2018-10-18 DIAGNOSIS — M48.062 SPINAL STENOSIS OF LUMBAR REGION WITH NEUROGENIC CLAUDICATION: ICD-10-CM

## 2018-10-18 PROCEDURE — 99203 OFFICE O/P NEW LOW 30 MIN: CPT | Performed by: NEUROLOGICAL SURGERY

## 2018-10-18 RX ORDER — METHOCARBAMOL 750 MG/1
750 TABLET, FILM COATED ORAL NIGHTLY
Qty: 30 TABLET | Refills: 0 | Status: SHIPPED | OUTPATIENT
Start: 2018-10-18 | End: 2018-11-17

## 2018-10-18 RX ORDER — NABUMETONE 750 MG/1
750 TABLET, FILM COATED ORAL 2 TIMES DAILY
Qty: 60 TABLET | Refills: 0 | Status: SHIPPED | OUTPATIENT
Start: 2018-10-18 | End: 2020-04-02

## 2018-10-18 NOTE — PROGRESS NOTES
Muriel Rainey  1955  [id number]      Chief Complaint   Patient presents with   • Back Pain       HISTORY OF PRESENT ILLNESS:  This is a 63-year-old female who presents with pain in her back rating to her left lower extremity of several months duration.  She has a history of degenerative osteoarthritis.  She has had her right shoulder operated on in 2016; she is scheduled have her left shoulder operated on next week.  The pain in the leg is consistent with nerve root entrapment and claudication.  She has not been to physical therapy.  She takes ibuprofen which provide some relief.  She has no bowel or bladder dysfunction.     Past Medical History:   Diagnosis Date   • Arthritis    • Dyspepsia    • Hernia    • Hyperlipidemia    • Hypertension    • Low back pain        Past Surgical History:   Procedure Laterality Date   • CHOLECYSTECTOMY  11/25016   • KNEE ARTHROPLASTY, PARTIAL REPLACEMENT Right    • ROTATOR CUFF REPAIR Right 2016       Family History   Problem Relation Age of Onset   • Arrhythmia Mother         unknown   • Arthritis Mother    • Heart attack Brother 42        CABG   • Clotting disorder Father    • No Known Problems Sister    • No Known Problems Sister    • Cirrhosis Sister        Social History     Social History   • Marital status:      Spouse name: N/A   • Number of children: N/A   • Years of education: N/A     Occupational History   • Not on file.     Social History Main Topics   • Smoking status: Former Smoker   • Smokeless tobacco: Never Used      Comment: 25 years ago quit smoking   • Alcohol use No   • Drug use: No   • Sexual activity: Defer     Other Topics Concern   • Not on file     Social History Narrative    Patient consumes 2-3 cups coffee daily, occasional tea or soda.     Patient lives at home with  .            Allergies   Allergen Reactions   • Flecainide Other (See Comments)     Expensive   • Gabapentin Mental Status Change   • Statins Myalgia         Current  "Outpatient Prescriptions:   •  hydrochlorothiazide (HYDRODIURIL) 25 MG tablet, Take 1 tablet by mouth Daily for 360 days., Disp: 90 tablet, Rfl: 3  •  ibuprofen (ADVIL,MOTRIN) 800 MG tablet, Take 800 mg by mouth 2 (Two) Times a Day As Needed., Disp: , Rfl:   •  losartan (COZAAR) 50 MG tablet, Take 1 tablet by mouth Daily for 360 days., Disp: 90 tablet, Rfl: 3  •  aspirin 81 MG tablet, Take 1 tablet by mouth Daily for 360 days., Disp: 90 tablet, Rfl: 3  •  esomeprazole (nexIUM) 40 MG capsule, Take 40 mg by mouth Daily As Needed., Disp: , Rfl:   •  meloxicam (MOBIC) 7.5 MG tablet, Take 7.5 mg by mouth 2 (Two) Times a Day., Disp: , Rfl:     Review of Systems   Musculoskeletal: Positive for back pain.   All other systems reviewed and are negative.      Vitals:    10/18/18 0908   BP: 126/68   BP Location: Left arm   Patient Position: Sitting   Pulse: 72   Resp: 18   Temp: 98.5 °F (36.9 °C)   SpO2: 98%   Weight: 77.6 kg (171 lb)   Height: 165.1 cm (65\")       Neurological Examination: Mental status/speech: The patient is alert and oriented.  Speech is clear without aphysia or dysarthria.  No overt cognitive deficits.    Cranial nerve examination:    Olfaction: Smell is intact.  Vision: Vision is intact without visual field abnormalities.  Funduscopic examination is normal.  No pupillary irregularity.  Ocular motor examination: The extraocular muscles are intact.  There is no diplopia.  The pupil is round and reactive to both light and accommodation.  There is no nystagmus.  Facial movement/sensation: There is no facial weakness.  Sensation is intact in the first, second, and third divisions of the trigeminal nerve.  The corneal reflex is intact.  Auditory: Hearing is intact to finger rub bilaterally.  Cranial nerves IX, X, XI, XII: Phonation is normal.  No dysphagia.  Tongue is protruded in the midline without atrophy.  The gag reflex is intact.  Shoulder shrug is normal.    Musculoligamentous ligamentous examination: She " has limitation in range of motion of the lumbar spine.  Straight leg raising, [place] and flip test are negative.  There is no weakness, sensory loss or reflex asymmetry.  Her gait is normal.               Medical Decision Making:     Diagnostic Data Set:   The lumbar MRI shows multilevel degenerative disc disease with moderate spinal stenosis.  There is no evidence of a herniated disc.         Assessment:   degenerative joint disease, spinal stenosis            Recommendations:   I have given her prescription of Relafen 750 mg twice a day and Robaxin 750 mg at night.  I've asked her to call me in 2 weeks.  If she is not better I would recommend epidural steroid injection or facet block.  If that fails she would require additional diagnostic studies with an aimed toward surgical intervention.  I would expect however for this to be managed conservatively.          I greatly appreciate the opportunity to see and evaluate this individual.  If you have questions or concerns regarding issues that I may have overlooked pese call me at anytme: [phone].  Deuce Melendez M.D.  [facility]  [address] [place] [address].  [place] [address]

## 2018-10-18 NOTE — PATIENT INSTRUCTIONS
call Dr. Randall on a Monday or Tuesday with an update.  Ask for  Giselle  and leave a message for  Dr. Randall.  He will call you back at the end of the day as soon as he can.     [phone]

## 2018-11-19 ENCOUNTER — TELEPHONE (OUTPATIENT)
Dept: NEUROSURGERY | Facility: CLINIC | Age: 63
End: 2018-11-19

## 2018-11-19 NOTE — TELEPHONE ENCOUNTER
----- Message from Giselle Orantes sent at 11/19/2018 10:50 AM EST -----  Contact: [phone]  PATIENT CALLING TO REPORT ON HER CONDITION.  STATES SHE HAS NOT HAD ANY CHANGE IN HER PAIN WITH THE MEDICATION.

## 2018-11-20 DIAGNOSIS — M48.061 SPINAL STENOSIS OF LUMBAR REGION, UNSPECIFIED WHETHER NEUROGENIC CLAUDICATION PRESENT: ICD-10-CM

## 2018-11-20 DIAGNOSIS — M51.36 DDD (DEGENERATIVE DISC DISEASE), LUMBAR: Primary | ICD-10-CM

## 2020-04-01 NOTE — PROGRESS NOTES
"[facility]  Follow-up note    Encounter Date:04/02/2020    Patient ID: Muriel Rainey is a  64 y.o. female who resides in [place], [place].    CC/Reason for visit:    · Palpitations         Problem List Items Addressed This Visit        Cardiology Problems    PVC (premature ventricular contraction) - Primary    Overview     · ZIO (07/03/2017): Predominantly normal sinus rhythm with average heart rate 74.  Less than 1% of PAC burden.  19.6% PVC burden.  · Echo (6/2/2017): LVEF 55%.  No significant valve abnormality         Current Assessment & Plan     · Description of symptoms as well as ambulatory EKGs suggest symptomatic PVCs as cause of her palpitations  · Previously on sotalol, but discontinued as it was \"too strong\"  · We will try metoprolol tartrate 12.5 mg twice daily and may increase 25 mg twice daily if needed  · Patient instructed to contact the office if this does not improve symptoms         Relevant Medications    metoprolol tartrate (LOPRESSOR) 25 MG tablet        64-year-old female having recurrence of symptomatic PVCs.  I would discontinue long-acting nitrate therapy and start metoprolol.  If metoprolol ineffective and improving her symptoms, may need to resume sotalol.       · Discontinue isosorbide  · Start metoprolol 12.5 mg twice daily for palpitations.  This may be increased to 25 mg twice daily  · Patient instructed to contact the office if symptoms do not improve  Return in about 1 year (around 4/2/2021).            Muriel Rainey is a 64 y.o. female who returns to the office for palpitation symptoms.  The patient states that she is having flopping in her chest like she did several years ago when she had been prescribed sotalol for PVCs.  The PVCs became less symptomatic and she eventually stopped taking sotalol.  She states that recently she had an upper respiratory tract infection and her palpitations have recurred.  She denies any changes in her exercise " "tolerance and is able to clean the house without chest discomfort to suggest angina.  She will get some tightness in her chest when she is having the PVCs at rest, however.  Patient also denies orthopnea, PND or shortness of breath with her activities.    Review of Systems   Constitution: Negative for malaise/fatigue.   Eyes: Negative for vision loss in left eye and vision loss in right eye.   Cardiovascular: Positive for chest pain and palpitations. Negative for dyspnea on exertion, near-syncope, orthopnea, paroxysmal nocturnal dyspnea and syncope.   Musculoskeletal: Negative for myalgias.   Neurological: Negative for brief paralysis, excessive daytime sleepiness, focal weakness, numbness, paresthesias and weakness.   All other systems reviewed and are negative.      The patient's past medical, social, family history and ROS reviewed in the patient's electronic medical record.    Allergies  Flecainide; Gabapentin; and Statins    Outpatient Medications Marked as Taking for the 4/2/20 encounter (Office Visit) with Naihd Vaughn IV, MD   Medication Sig Dispense Refill   • ibuprofen (ADVIL,MOTRIN) 800 MG tablet Take 800 mg by mouth 2 (Two) Times a Day As Needed.     • [DISCONTINUED] isosorbide mononitrate (IMDUR) 30 MG 24 hr tablet 30 mg Daily.             Blood pressure 138/88, pulse 74, height 162.6 cm (64\"), weight 67.6 kg (149 lb), SpO2 96 %.  Body mass index is 25.58 kg/m².  Vitals:    04/02/20 0910   Patient Position: Sitting       Physical Exam   Constitutional: She is oriented to person, place, and time. She appears well-developed and well-nourished.   HENT:   Head: Normocephalic and atraumatic.   Eyes: Pupils are equal, round, and reactive to light. No scleral icterus.   Neck: No JVD present. Carotid bruit is not present. No thyromegaly present.   Cardiovascular: Normal rate, regular rhythm, S1 normal and S2 normal. Exam reveals no gallop.   No murmur heard.  Pulmonary/Chest: Effort normal and " breath sounds normal.   Abdominal: Soft. She exhibits no mass. There is no hepatosplenomegaly. There is no tenderness.   Neurological: She is alert and oriented to person, place, and time.   Skin: Skin is warm and dry. No cyanosis. Nails show no clubbing.   Psychiatric: She has a normal mood and affect. Her behavior is normal.       Data Review (reviewed with patient):       ECG 12 Lead  Date/Time: 4/2/2020 9:42 AM  Performed by: Nahid Vaughn IV, MD  Authorized by: Nahid Vaughn IV, MD   Comparison: compared with previous ECG from 3/31/2020  Comparison to previous ECG: PVCs no longer present  Rhythm: sinus rhythm  BPM: 65  Other findings: poor R wave progression    Clinical impression: abnormal EKG          Labs (03/26/2020):  · WBC 8.2, RBC 4.38, HGB 13.3, HCT 41.0,       H. C. Vish Vaughn MD [facility], [facility]  Interventional and General Cardiology

## 2020-04-02 ENCOUNTER — OFFICE VISIT (OUTPATIENT)
Dept: CARDIOLOGY | Facility: CLINIC | Age: 65
End: 2020-04-02

## 2020-04-02 VITALS
HEIGHT: 64 IN | HEART RATE: 74 BPM | OXYGEN SATURATION: 96 % | DIASTOLIC BLOOD PRESSURE: 88 MMHG | SYSTOLIC BLOOD PRESSURE: 138 MMHG | WEIGHT: 149 LBS | BODY MASS INDEX: 25.44 KG/M2

## 2020-04-02 DIAGNOSIS — I49.3 PVC (PREMATURE VENTRICULAR CONTRACTION): Primary | ICD-10-CM

## 2020-04-02 PROBLEM — I10 ESSENTIAL HYPERTENSION: Status: RESOLVED | Noted: 2018-09-26 | Resolved: 2020-04-02

## 2020-04-02 PROCEDURE — 99213 OFFICE O/P EST LOW 20 MIN: CPT | Performed by: INTERNAL MEDICINE

## 2020-04-02 PROCEDURE — 93000 ELECTROCARDIOGRAM COMPLETE: CPT | Performed by: INTERNAL MEDICINE

## 2020-04-02 RX ORDER — ISOSORBIDE MONONITRATE 30 MG/1
30 TABLET, EXTENDED RELEASE ORAL DAILY
COMMUNITY
Start: 2020-03-26 | End: 2020-04-02

## 2020-04-02 RX ORDER — TRIAMCINOLONE ACETONIDE 5 MG/G
CREAM TOPICAL
COMMUNITY
Start: 2020-02-12 | End: 2020-04-02

## 2020-04-02 RX ORDER — EZETIMIBE 10 MG/1
TABLET ORAL
COMMUNITY
Start: 2020-03-19 | End: 2020-04-02

## 2020-04-02 RX ORDER — FLUTICASONE PROPIONATE 50 MCG
SPRAY, SUSPENSION (ML) NASAL
COMMUNITY
Start: 2020-03-20 | End: 2020-04-02

## 2020-04-02 NOTE — ASSESSMENT & PLAN NOTE
"· Description of symptoms as well as ambulatory EKGs suggest symptomatic PVCs as cause of her palpitations  · Previously on sotalol, but discontinued as it was \"too strong\"  · We will try metoprolol tartrate 12.5 mg twice daily and may increase 25 mg twice daily if needed  · Patient instructed to contact the office if this does not improve symptoms  "

## 2021-01-26 ENCOUNTER — TELEPHONE (OUTPATIENT)
Dept: CARDIOLOGY | Facility: CLINIC | Age: 66
End: 2021-01-26

## 2021-01-26 NOTE — TELEPHONE ENCOUNTER
"Patient returned call. Patient states that she is having worsening palpitations. Patient reports that her BP \"seems to be okay\".     Per last office note: patient to increase Metoprolol to 25 mg BID. Patient agreeable to plan.   "

## 2021-04-13 ENCOUNTER — OFFICE VISIT (OUTPATIENT)
Dept: CARDIOLOGY | Facility: CLINIC | Age: 66
End: 2021-04-13

## 2021-04-13 VITALS
DIASTOLIC BLOOD PRESSURE: 80 MMHG | HEART RATE: 59 BPM | OXYGEN SATURATION: 96 % | SYSTOLIC BLOOD PRESSURE: 178 MMHG | BODY MASS INDEX: 26.29 KG/M2 | HEIGHT: 64 IN | WEIGHT: 154 LBS

## 2021-04-13 DIAGNOSIS — I10 ESSENTIAL HYPERTENSION: Chronic | ICD-10-CM

## 2021-04-13 DIAGNOSIS — I20.8 ATYPICAL ANGINA (HCC): ICD-10-CM

## 2021-04-13 DIAGNOSIS — I49.3 PVC (PREMATURE VENTRICULAR CONTRACTION): ICD-10-CM

## 2021-04-13 DIAGNOSIS — I48.0 PAROXYSMAL ATRIAL FIBRILLATION (HCC): Primary | Chronic | ICD-10-CM

## 2021-04-13 PROBLEM — I20.89 ATYPICAL ANGINA: Status: ACTIVE | Noted: 2017-06-02

## 2021-04-13 PROCEDURE — 93000 ELECTROCARDIOGRAM COMPLETE: CPT | Performed by: INTERNAL MEDICINE

## 2021-04-13 PROCEDURE — 99213 OFFICE O/P EST LOW 20 MIN: CPT | Performed by: INTERNAL MEDICINE

## 2021-04-13 NOTE — ASSESSMENT & PLAN NOTE
· Patient reports blood pressure has been well controlled at home since increasing metoprolol tartrate 25 mg twice daily  · Elevated blood pressure in office today is spurious, patient states  · Continue metoprolol

## 2021-04-13 NOTE — PROGRESS NOTES
"[facility]      Identification: Muriel Rainey is a 65 y.o. female who who resides in [place], [place].       Reason for visit:  · Paroxysmal atrial fibrillation  · PVC      Subjective      Muriel Rainey presents to [facility] for followup.      She states that she has occasional mild chest tightness on the right side. She states she has been doing a lot of home improvement and she feels the pain after working on the house, which makes her think it may be muscular. She states it is non-exertional and nothing makes it better or worse. The area where the pain occurs is non-tender as well. She states her blood pressures at home have been around 118-120 systolic. She is asking to change her metoprolol to hydrochlorothiazide because she liked hydrochlorothiazide better. The metoprolol is controlling her palpitations and blood pressure well, she notes. She has been taking metoprolol one tablet twice daily. She denies palpitations.     The patient is scheduled for a colonoscopy with Dr. Sarmiento and asked if she needs to be cleared from a cardiac standpoint.       Objective     /80 (BP Location: Left arm, Patient Position: Sitting)   Pulse 59   Ht 162.6 cm (64\")   Wt 69.9 kg (154 lb)   SpO2 96%   BMI 26.43 kg/m²       Constitutional:       Appearance: Healthy appearance. Well-developed.   Eyes:      General: No scleral icterus.  HENT:      Head: Normocephalic and atraumatic.   Neck:      Thyroid: No thyromegaly.      Vascular: No carotid bruit or JVD.   Pulmonary:      Effort: Pulmonary effort is normal.      Breath sounds: Normal breath sounds.   Cardiovascular:      Normal rate. Regular rhythm.      Murmurs: There is no murmur.      No gallop.   Abdominal:      Palpations: Abdomen is soft. There is no abdominal mass.      Tenderness: There is no abdominal tenderness.   Musculoskeletal:      Extremities: No clubbing present.Skin:     General: Skin is warm and dry. There is no cyanosis. "   Neurological:      Mental Status: Alert.   Psychiatric:         Attention and Perception: Attention normal.         Behavior: Behavior normal.         Result Review :    Lab Results   Component Value Date    GLUCOSE 93 06/01/2017    BUN 14 06/01/2017    CREATININE 0.80 06/01/2017    EGFRIFNONA 72 06/01/2017    BCR 17.5 06/01/2017    K 4.7 06/01/2017    CO2 29.0 06/01/2017    CALCIUM 10.2 06/01/2017    ALBUMIN 4.60 06/01/2017    AST 21 06/01/2017    ALT 22 06/01/2017     Lab Results   Component Value Date    WBC 8.92 06/01/2017    HGB 13.3 06/01/2017    HCT 39.5 06/01/2017    MCV 93.2 06/01/2017     06/01/2017         ECG 12 Lead    Date/Time: 4/13/2021 9:38 AM  Performed by: Nahid Vaughn IV, MD  Authorized by: Nahid Vaughn IV, MD   Comparison: compared with previous ECG from 4/2/2020  Similar to previous ECG  Rhythm: sinus rhythm  Ectopy: atrial premature contractions  BPM: 53  Other findings: poor R wave progression    Clinical impression: non-specific ECG             Assessment     Problem List Items Addressed This Visit        Cardiology Problems    Paroxysmal atrial fibrillation (CMS/HCC) - Primary (Chronic)    Overview     · 24-hour Holter monitor (5/22/2017): Short episodes of atrial fibrillation  · 72 hour Holter (6/2017): Sinus rhythm with frequent PVCs (PVC burden 19.6%)  · Echo (6/2/2017): EF 55%. Mild TR.  · Pharmacologic nuclear stress (6/5/2017): EF 58%.  Normal perfusion.  Frequent PVCs noted.  · 7 day Holter (2/2018): Sinus rhythm with average heart rate 71 bpm. Short episodes of atrial tachycardia. Occasional PVCs. No atrial fibrillation.  · Chads VASC  1 (female)         Current Assessment & Plan     · Minimally symptomatic on metoprolol  · Continue metoprolol tartrate 25 mg twice daily         Relevant Medications    metoprolol tartrate (LOPRESSOR) 25 MG tablet    Other Relevant Orders    ECG 12 Lead    Atypical angina (CMS/HCC)    Overview     · Nonexertional  chest pain symptoms  · Pharmacologic nuclear stress (6/5/2017): Normal perfusion.  LVEF 58%.  Frequent PVCs         Current Assessment & Plan     · Persistent atypical chest pain symptoms with good exercise capacity  · Continue metoprolol         Relevant Medications    metoprolol tartrate (LOPRESSOR) 25 MG tablet    PVC (premature ventricular contraction)    Overview     · Pharmacologic nuclear stress (6/5/2017): Normal perfusion.  LVEF 58%.  Frequent PVCs  · ZIO (07/03/2017): Predominantly normal sinus rhythm with average heart rate 74.  Less than 1% of PAC burden.  19.6% PVC burden.  · Echo (6/2/2017): LVEF 55%.  No significant valve abnormality         Relevant Medications    metoprolol tartrate (LOPRESSOR) 25 MG tablet          Plan   • Continue metoprolol tartrate 25 mg twice daily  • Patient will monitor blood pressure at home and contact office if >130/80      Follow-up   Return in about 1 year (around 4/13/2022).      Scribed for Nahid Vaughn IV, MD by DIO GONZALEZ.  04/13/21   10:50 EDT    I have personally performed the services described in this document as scribed by the above individual, and it is both accurate and complete.  Nahid Vaughn IV, MD  4/13/2021  12:49 EDT

## 2021-06-10 ENCOUNTER — TELEPHONE (OUTPATIENT)
Dept: NEUROSURGERY | Facility: CLINIC | Age: 66
End: 2021-06-10

## 2021-06-10 NOTE — TELEPHONE ENCOUNTER
Pt reports progressively worse symptoms related to spinal stenosis and LLE pain.  Pt will need re-evaluation for appropriate studies to be ordered.      After speaking with Qian, she agreed to see pt in [place].     Please add pt on to see Qian in [place] first available add on.  Thank you!

## 2021-06-25 ENCOUNTER — HOSPITAL ENCOUNTER (OUTPATIENT)
Dept: GENERAL RADIOLOGY | Facility: HOSPITAL | Age: 66
Discharge: HOME | End: 2021-06-25
Admitting: PHYSICIAN ASSISTANT

## 2021-06-25 ENCOUNTER — OFFICE VISIT (OUTPATIENT)
Dept: NEUROSURGERY | Facility: CLINIC | Age: 66
End: 2021-06-25

## 2021-06-25 VITALS
TEMPERATURE: 97.2 F | SYSTOLIC BLOOD PRESSURE: 145 MMHG | RESPIRATION RATE: 18 BRPM | WEIGHT: 156 LBS | BODY MASS INDEX: 25.99 KG/M2 | OXYGEN SATURATION: 98 % | HEART RATE: 55 BPM | HEIGHT: 65 IN | DIASTOLIC BLOOD PRESSURE: 72 MMHG

## 2021-06-25 DIAGNOSIS — M47.816 LUMBAR SPONDYLOSIS: ICD-10-CM

## 2021-06-25 DIAGNOSIS — M54.41 BILATERAL LOW BACK PAIN WITH RIGHT-SIDED SCIATICA, UNSPECIFIED CHRONICITY: ICD-10-CM

## 2021-06-25 DIAGNOSIS — M47.816 LUMBAR SPONDYLOSIS: Primary | ICD-10-CM

## 2021-06-25 PROCEDURE — 72110 X-RAY EXAM L-2 SPINE 4/>VWS: CPT | Performed by: RADIOLOGY

## 2021-06-25 PROCEDURE — 99214 OFFICE O/P EST MOD 30 MIN: CPT | Performed by: PHYSICIAN ASSISTANT

## 2021-06-25 PROCEDURE — 72110 X-RAY EXAM L-2 SPINE 4/>VWS: CPT

## 2021-06-25 RX ORDER — GABAPENTIN 100 MG/1
100 CAPSULE ORAL NIGHTLY
Qty: 20 CAPSULE | Refills: 0 | Status: SHIPPED | OUTPATIENT
Start: 2021-06-25 | End: 2022-04-22

## 2021-06-25 RX ORDER — LACTULOSE 10 G/15ML
SOLUTION ORAL
COMMUNITY
Start: 2021-04-21 | End: 2022-04-22

## 2021-06-25 NOTE — PROGRESS NOTES
Patient: Muriel Rainey  : 1955  Gender: female    Primary Care Provider: Kimberley Maza APRN    Chief Complaint: Back pain with right sciatica    History of Present Illness:  Ms. Rainey is a 66-year-old woman who presents today for the above-noted complaints.  She has a history of chronic low back difficulties for which she has seen Dr. Randall in the past.  She states that several months ago she suffered a fall in which she broke several ribs.  Shortly after she had worsening pain in her back.  She notes focal pain in her posterior right buttock region that radiates posteriorly to the right leg, extending to the knee.  The pain is constant with waxing and waning episodes.  The pain does not appear to be positional.  She feels that sometimes walking actually helps her leg pain.  She denies focal numbness or weakness in her lower extremities.  No bowel or bladder difficulties.  She states that she is not interested in pain pills as these do not help.  She took one of her husbands tramadols and reports this is a miracle drug for her.  In the past she has not tolerated Lyrica or gabapentin well.  She states that she was previously given a very large dose of gabapentin 1 time which made her sick. She is not keen on physical therapy as this did not go well for her shoulder in the past.  She presents today for lumbar MRI.      Past Medical and Surgical History:  Past Medical History:   Diagnosis Date   • Arthritis    • Back pain    • Cataract     LEFT   • Dyspepsia    • Hernia    • Hyperlipidemia    • Hypertension    • Low back pain      Past Surgical History:   Procedure Laterality Date   • CATARACT EXTRACTION Left    • CHOLECYSTECTOMY     • HIATAL HERNIA REPAIR     • KNEE ARTHROPLASTY, PARTIAL REPLACEMENT Right    • ROTATOR CUFF REPAIR Right 2016   • SHOULDER ROTATOR CUFF REPAIR Right        Current Medications:    Current Outpatient Medications:   •  Constulose 10 GM/15ML solution, , Disp: , Rfl:   •   "ibuprofen (ADVIL,MOTRIN) 800 MG tablet, Take 800 mg by mouth 2 (Two) Times a Day As Needed., Disp: , Rfl:   •  metoprolol tartrate (LOPRESSOR) 25 MG tablet, Take 1 tablet by mouth 2 (Two) Times a Day., Disp: 180 tablet, Rfl: 3    Allergies:  Allergies   Allergen Reactions   • Flecainide Other (See Comments)     Expensive   • Gabapentin Mental Status Change   • Statins Myalgia         Review of Systems   Eyes: Positive for itching.   Musculoskeletal: Positive for arthralgias, back pain and gait problem.         Physical Exam  Constitutional:       Appearance: Normal appearance.   Musculoskeletal:         General: Normal range of motion.      Cervical back: Normal range of motion and neck supple.      Comments: Hip ROM intact bilaterally without evidence of instability or pain   Skin:     General: Skin is warm and dry.   Neurological:      Mental Status: She is alert and oriented to person, place, and time.      Sensory: Sensation is intact.      Motor: Motor function is intact.      Coordination: Coordination is intact.      Gait: Gait is intact.      Deep Tendon Reflexes:      Reflex Scores:       Bicep reflexes are 2+ on the right side and 2+ on the left side.       Brachioradialis reflexes are 2+ on the right side and 2+ on the left side.       Patellar reflexes are 2+ on the right side and 2+ on the left side.       Achilles reflexes are 2+ on the right side and 2+ on the left side.     Comments: SLR reproduces right sciatica type pain on the right.  Negative on the left.  Her gait is mildly antalgic  Sanna sign is negative bilaterally   Psychiatric:         Mood and Affect: Mood normal.         Behavior: Behavior normal.           Vitals:    06/25/21 1015   BP: 145/72   BP Location: Left arm   Patient Position: Sitting   Cuff Size: Large Adult   Pulse: 55   Resp: 18   Temp: 97.2 °F (36.2 °C)   TempSrc: Infrared   SpO2: 98%   Weight: 70.8 kg (156 lb)   Height: 165.1 cm (65\")       Patient's Body mass index is " 25.96 kg/m². indicating that she is within normal range (BMI 18.5-24.9). No BMI management plan needed..    Independent Review of Diagnostic Imaging:  Lumbar spine MRI performed 6/8/2021 demonstrates multilevel lumbar lytic changes.  There is diffuse facet hypertrophy.  There is retrolisthesis of L3 on L4.  There is moderate canal and neuroforaminal narrowing throughout.  There is no high-grade canal stenosis.    Assessment:  1.  Acute on chronic low back pain with right sciatica  2.  Lumbar spondylosis    Plan:  Ms. Rainey is seen today for evaluation of progressive back and right sciatica type pain that occurred several months ago after a fall.  Lumbar MRI as detailed above.  I do not have her previous MRI scan for comparison however there has likely been some progression since that time.  She has not done much in the way of conservatism.  I have given her an order for physical therapy.  We had a long discussion regarding gabapentin for her sciatica type pain.  She has inquired about starting this again at a low dose.  I have given her a small dose to take at night to see how she tolerates this.  I will call her next week to assess her progress with medication and after reviewing her studies with Dr. King.  She will get flexion-extension plain films on her way out today.         Qian Harding PA-C

## 2021-07-08 ENCOUNTER — TELEPHONE (OUTPATIENT)
Dept: NEUROSURGERY | Facility: CLINIC | Age: 66
End: 2021-07-08

## 2021-07-08 DIAGNOSIS — M47.816 LUMBAR SPONDYLOSIS: Primary | ICD-10-CM

## 2021-07-08 RX ORDER — TRAMADOL HYDROCHLORIDE 50 MG/1
50 TABLET ORAL 2 TIMES DAILY PRN
Qty: 30 TABLET | Refills: 0 | Status: SHIPPED | OUTPATIENT
Start: 2021-07-08 | End: 2021-07-23 | Stop reason: SDUPTHER

## 2021-07-08 NOTE — TELEPHONE ENCOUNTER
Provider:  NII Harding  Caller: Pt  Time of call:   9:34a  Phone #:  [phone]  Surgery:  --  Surgery Date:  --  Last visit:   6/25/21-Qian in [place]  Next visit: ---        Reason for call:         Muriel called returning Qian's call. She wanted Qian to know that the Gabapentin aren't doing anything for her and wanted to discuss being prescribed Ultram/Tramadol.     Please advise.

## 2021-07-23 DIAGNOSIS — M47.816 LUMBAR SPONDYLOSIS: Primary | ICD-10-CM

## 2021-07-23 DIAGNOSIS — M54.41 BILATERAL LOW BACK PAIN WITH RIGHT-SIDED SCIATICA, UNSPECIFIED CHRONICITY: ICD-10-CM

## 2021-07-23 RX ORDER — TRAMADOL HYDROCHLORIDE 50 MG/1
50 TABLET ORAL 2 TIMES DAILY PRN
Qty: 30 TABLET | Refills: 0 | Status: SHIPPED | OUTPATIENT
Start: 2021-07-23 | End: 2022-04-22

## 2021-07-23 NOTE — TELEPHONE ENCOUNTER
Provider:  Arelis CORDON   Caller: patient  Time of call:     Phone #:  [phone]  Surgery:  no  Surgery Date:    Last visit:   06/25/21  Next visit: None    VIJI:     04/15/2021 Acetaminophen/Codeine 300MG/30MG 1955 14 7 MARIUMMIGUEL GNOZALEZVidable [facility] 9 1  04/21/2021 Acetaminophen/Codeine 300MG/30MG 1955 6 3 MARIUMMIGUEL ROYCharleston [facility]  [employer] [facility] 9 1  06/25/2021 Gabapentin 100MG 1955 20 20 IDALMIS CHAVEZ [employer] [facility] 1  07/08/2021 Tramadol Hcl 50MG 1955 30 15 KAYLEIGHSEUN ABELINO [employer] [facility] 10 1    Reason for call:    Patient requests refill on Tramadol.

## 2021-08-09 DIAGNOSIS — M47.816 LUMBAR SPONDYLOSIS: ICD-10-CM

## 2021-08-09 RX ORDER — TRAMADOL HYDROCHLORIDE 50 MG/1
50 TABLET ORAL 2 TIMES DAILY PRN
Qty: 30 TABLET | Refills: 0 | OUTPATIENT
Start: 2021-08-09

## 2021-08-09 NOTE — TELEPHONE ENCOUNTER
Provider:  Fabiola  Caller: warren  Time of call:   10:07  Phone #: [phone]  Surgery:  na  Surgery Date:  na  Last visit:   6-25-21  Next visit: na        Reason for call:     Pt is requesting a refill for her Tramadol.      Garcia: 07/08/2021 Tramadol Hcl 50MG 1955 30 15 MARIOLA ADRIANSemtek Innovative [employer] [facility] 10 1  07/23/2021 Tramadol Hcl 50MG 1955 30 15 FABIOLA BATEMAN [facility] 10 1    Requested Prescriptions     Pending Prescriptions Disp Refills   • traMADol (ULTRAM) 50 MG tablet 30 tablet 0     Sig: Take 1 tablet by mouth 2 (Two) Times a Day As Needed for Moderate Pain .

## 2021-08-09 NOTE — TELEPHONE ENCOUNTER
She will need to see pain management for them to write tramadol for her long-term if her PCP will not.

## 2021-08-09 NOTE — TELEPHONE ENCOUNTER
I called patient back and she does not want to try the PM, she stated that she tried before and it didn't help. Is there anything else. She can't take Gabapentin or Lyrica. She don't want Loritab because it doesn't help. She said the only thing that helps is the Tramadol. Is there something else she can try.Thank you.

## 2022-04-22 ENCOUNTER — OFFICE VISIT (OUTPATIENT)
Dept: CARDIOLOGY | Facility: CLINIC | Age: 67
End: 2022-04-22

## 2022-04-22 VITALS
BODY MASS INDEX: 25.89 KG/M2 | SYSTOLIC BLOOD PRESSURE: 140 MMHG | HEIGHT: 65 IN | OXYGEN SATURATION: 97 % | WEIGHT: 155.4 LBS | HEART RATE: 57 BPM | DIASTOLIC BLOOD PRESSURE: 78 MMHG

## 2022-04-22 DIAGNOSIS — I47.1 PAROXYSMAL SVT (SUPRAVENTRICULAR TACHYCARDIA): Primary | ICD-10-CM

## 2022-04-22 DIAGNOSIS — I10 ESSENTIAL HYPERTENSION: ICD-10-CM

## 2022-04-22 DIAGNOSIS — I20.8 ATYPICAL ANGINA: ICD-10-CM

## 2022-04-22 PROBLEM — I47.10 PAROXYSMAL SVT (SUPRAVENTRICULAR TACHYCARDIA): Status: ACTIVE | Noted: 2017-06-02

## 2022-04-22 PROCEDURE — 93000 ELECTROCARDIOGRAM COMPLETE: CPT | Performed by: INTERNAL MEDICINE

## 2022-04-22 PROCEDURE — 99214 OFFICE O/P EST MOD 30 MIN: CPT | Performed by: INTERNAL MEDICINE

## 2022-04-22 RX ORDER — ACETAMINOPHEN 500 MG
500 TABLET ORAL EVERY 6 HOURS PRN
COMMUNITY

## 2022-04-22 RX ORDER — ASPIRIN 81 MG/1
81 TABLET ORAL DAILY
Start: 2022-04-22

## 2022-04-22 RX ORDER — ASPIRIN 81 MG/1
81 TABLET ORAL DAILY
COMMUNITY
End: 2022-04-22 | Stop reason: SDUPTHER

## 2022-04-22 RX ORDER — MELATONIN
1000 DAILY
COMMUNITY

## 2022-04-22 RX ORDER — MULTIVIT WITH MINERALS/LUTEIN
250 TABLET ORAL DAILY
COMMUNITY

## 2022-04-22 RX ORDER — PAROXETINE 10 MG/1
TABLET, FILM COATED ORAL DAILY
COMMUNITY
Start: 2022-04-14

## 2022-04-22 NOTE — PROGRESS NOTES
"[facility]      Identification: Muriel Rainey is a 66 y.o. female who [place], [place].     Reason for visit:  · Palpitations      Subjective      Muriel Rainey presents to [facility] for followup.    Muriel is having palpitation symptoms about every 3 or 4 days.  She states that sometimes her heart rate will be about 100 bpm and lasts that way for most the day.  She takes metoprolol which seems to help, but has had to half the dose from 25 to 12.5 mg due to low blood pressure.  She wonders why her thyroid might be causing some of the symptoms to occur.  She is not had recent blood work.      Review of Systems   Cardiovascular: Negative.    Respiratory: Negative.        Objective     /78 (BP Location: Right arm, Patient Position: Sitting)   Pulse 57   Ht 165.1 cm (65\")   Wt 70.5 kg (155 lb 6.4 oz)   SpO2 97%   BMI 25.86 kg/m²         Constitutional:       Appearance: Healthy appearance. Well-developed.   Eyes:      General: Lids are normal. No scleral icterus.     Conjunctiva/sclera: Conjunctivae normal.   HENT:      Head: Normocephalic and atraumatic.   Neck:      Thyroid: No thyromegaly.      Vascular: No carotid bruit or JVD.   Pulmonary:      Effort: Pulmonary effort is normal.      Breath sounds: Normal breath sounds. No wheezing. No rhonchi. No rales.   Cardiovascular:      Normal rate. Regular rhythm.      Murmurs: There is no murmur.      No gallop. No rub.   Pulses:     Intact distal pulses.   Edema:     Peripheral edema absent.   Abdominal:      General: There is no distension.      Palpations: Abdomen is soft. There is no abdominal mass.   Musculoskeletal:      Cervical back: Normal range of motion. Skin:     General: Skin is warm and dry.      Findings: No rash.   Neurological:      General: No focal deficit present.      Mental Status: Alert and oriented to person, place, and time.      Gait: Gait is intact.   Psychiatric:         Attention and Perception: Attention " normal.         Mood and Affect: Mood normal.         Behavior: Behavior normal.         Result Review :      ECG 12 Lead    Date/Time: 4/22/2022 1:52 PM  Performed by: Nahid Vaughn IV, MD  Authorized by: Nahid Vaughn IV, MD   Comparison: compared with previous ECG from 4/13/2021  Similar to previous ECG  Rhythm: sinus rhythm  BPM: 57  QRS axis: left  Other findings: poor R wave progression    Clinical impression: non-specific ECG          No recent laboratory studies available for review today.      Assessment     Problem List Items Addressed This Visit        Cardiology Problems    Essential hypertension (Chronic)    Overview     • Target blood pressure <130/80 mmHg           Current Assessment & Plan     · Reports occasional hypotension  · Will not change medicines for time being           Relevant Medications    metoprolol tartrate (LOPRESSOR) 25 MG tablet    Paroxysmal SVT (supraventricular tachycardia) (HCC) - Primary    Overview     · 24-hour Holter monitor (5/22/2017): Short episodes of atrial fibrillation  · 72 hour Holter (6/2017): Sinus rhythm with frequent PVCs (PVC burden 19.6%)  · Echo (6/2/2017): EF 55%. Mild TR.  · Pharmacologic nuclear stress (6/5/2017): EF 58%.  Normal perfusion.  Frequent PVCs noted.  · 7 day Holter (2/2018): Sinus rhythm with average heart rate 71 bpm. Short episodes of atrial tachycardia. Occasional PVCs. No atrial fibrillation.  · Chads VASC  1 (female)           Current Assessment & Plan     · Worsening palpitation symptoms since last Holter monitor  · Repeat Holter monitor  · Obtain TSH, CBC, CMP  · Patient advised to get Kardia device to monitor palpitations           Relevant Medications    aspirin 81 MG EC tablet    metoprolol tartrate (LOPRESSOR) 25 MG tablet    Other Relevant Orders    Holter Monitor - 72 Hour Up To 15 Days    Comprehensive Metabolic Panel    CBC (No Diff)    TSH+Free T4    Atypical angina (HCC)    Overview     · Nonexertional  chest pain symptoms  · Pharmacologic nuclear stress (6/5/2017): Normal perfusion.  LVEF 58%.  Frequent PVCs           Current Assessment & Plan     · No angina           Relevant Medications    metoprolol tartrate (LOPRESSOR) 25 MG tablet    Other Relevant Orders    Comprehensive Metabolic Panel    Lipid Panel          Plan   • Lab work today to include CMP, CBC, TSH, lipids  • Holter monitor      Follow-up   No follow-ups on file.        Vish Vaughn MD, [facility]  4/22/2022

## 2022-04-22 NOTE — ASSESSMENT & PLAN NOTE
· Worsening palpitation symptoms since last Holter monitor  · Repeat Holter monitor  · Obtain TSH, CBC, CMP  · Patient advised to get Kardia device to monitor palpitations

## 2022-04-23 LAB
ALBUMIN SERPL-MCNC: 4.3 G/DL (ref 3.5–5.2)
ALBUMIN/GLOB SERPL: 1.7 G/DL
ALP SERPL-CCNC: 87 U/L (ref 39–117)
ALT SERPL-CCNC: 15 U/L (ref 1–33)
AST SERPL-CCNC: 17 U/L (ref 1–32)
BILIRUB SERPL-MCNC: 0.3 MG/DL (ref 0–1.2)
BUN SERPL-MCNC: 16 MG/DL (ref 8–23)
BUN/CREAT SERPL: 19 (ref 7–25)
CALCIUM SERPL-MCNC: 9.6 MG/DL (ref 8.6–10.5)
CHLORIDE SERPL-SCNC: 102 MMOL/L (ref 98–107)
CHOLEST SERPL-MCNC: 222 MG/DL (ref 0–200)
CO2 SERPL-SCNC: 24.8 MMOL/L (ref 22–29)
CREAT SERPL-MCNC: 0.84 MG/DL (ref 0.57–1)
EGFRCR SERPLBLD CKD-EPI 2021: 76.3 ML/MIN/1.73
ERYTHROCYTE [DISTWIDTH] IN BLOOD BY AUTOMATED COUNT: 11.8 % (ref 12.3–15.4)
GLOBULIN SER CALC-MCNC: 2.6 GM/DL
GLUCOSE SERPL-MCNC: 90 MG/DL (ref 65–99)
HCT VFR BLD AUTO: 34.2 % (ref 34–46.6)
HDLC SERPL-MCNC: 63 MG/DL (ref 40–60)
HGB BLD-MCNC: 11.8 G/DL (ref 12–15.9)
LDLC SERPL CALC-MCNC: 141 MG/DL (ref 0–100)
MCH RBC QN AUTO: 31.1 PG (ref 26.6–33)
MCHC RBC AUTO-ENTMCNC: 34.5 G/DL (ref 31.5–35.7)
MCV RBC AUTO: 90.2 FL (ref 79–97)
PLATELET # BLD AUTO: 293 10*3/MM3 (ref 140–450)
POTASSIUM SERPL-SCNC: 4.5 MMOL/L (ref 3.5–5.2)
PROT SERPL-MCNC: 6.9 G/DL (ref 6–8.5)
RBC # BLD AUTO: 3.79 10*6/MM3 (ref 3.77–5.28)
SODIUM SERPL-SCNC: 140 MMOL/L (ref 136–145)
T4 SERPL-MCNC: 7.7 UG/DL (ref 4.5–12)
TRIGL SERPL-MCNC: 100 MG/DL (ref 0–150)
TSH SERPL DL<=0.005 MIU/L-ACNC: 1.84 UIU/ML (ref 0.45–4.5)
VLDLC SERPL CALC-MCNC: 18 MG/DL (ref 5–40)
WBC # BLD AUTO: 6.21 10*3/MM3 (ref 3.4–10.8)

## 2022-05-06 ENCOUNTER — TELEPHONE (OUTPATIENT)
Dept: CARDIOLOGY | Facility: CLINIC | Age: 67
End: 2022-05-06

## 2022-05-06 DIAGNOSIS — E78.5 HYPERLIPIDEMIA LDL GOAL <100: Primary | ICD-10-CM

## 2022-05-06 PROBLEM — Z91.89 CANDIDATE FOR STATIN THERAPY DUE TO RISK OF FUTURE CARDIOVASCULAR EVENT: Status: ACTIVE | Noted: 2022-05-06

## 2022-05-06 NOTE — TELEPHONE ENCOUNTER
----- Message from Nahid Vaughn IV, MD sent at 5/6/2022  8:33 AM EDT -----  Let her know that based on ACC/AHA guidelines, she would be a candidate for moderate dose statin therapy f to prevent heart attacks and strokes.  Understand she has not tolerated statins in the past and we can defer if she so chooses.  Otherwise, pre  scribe rosuvastatin 5 mg daily and get CMP and lipids in 6 weeks.

## 2022-05-06 NOTE — TELEPHONE ENCOUNTER
Discussed with pt- she has Atorvastatin 10 mg at home and is willing to try this every other day. Will mail repeat lab orders to her for 6-8 weeks. She will call the office and let us know when and where she had labs drawn just in case we do not receive the results.

## 2022-05-24 ENCOUNTER — TELEPHONE (OUTPATIENT)
Dept: CARDIOLOGY | Facility: CLINIC | Age: 67
End: 2022-05-24

## 2022-06-26 DIAGNOSIS — I47.1 PAROXYSMAL SVT (SUPRAVENTRICULAR TACHYCARDIA): ICD-10-CM

## 2022-06-26 DIAGNOSIS — I20.8 ATYPICAL ANGINA: ICD-10-CM

## 2023-04-27 NOTE — PROGRESS NOTES
"    Cardiology Outpatient Visit      Identification: Muriel Rainey is a 67 y.o. female who resides in [place], [place].     Reason for visit:  Paroxysmal SVT  Essential      Subjective      Muriel returns to the office today.  She is feeling well and denies palpitation symptoms so long as she takes metoprolol.  She had a cut the dose in half as taking a full 25 mg tablet made her tired.  She states her blood pressure at home is typically around 130 to 140 mmHg.    Review of Systems   Constitutional: Negative for malaise/fatigue.   Eyes: Negative for vision loss in left eye and vision loss in right eye.   Cardiovascular: Negative for chest pain, dyspnea on exertion, near-syncope, orthopnea, palpitations, paroxysmal nocturnal dyspnea and syncope.   Musculoskeletal: Negative for myalgias.   Neurological: Negative for brief paralysis, excessive daytime sleepiness, focal weakness, numbness, paresthesias and weakness.   All other systems reviewed and are negative.      Allergies   Allergen Reactions   • Flecainide Other (See Comments)     Expensive   • Gabapentin Mental Status Change   • Statins Myalgia         Current Outpatient Medications   Medication Instructions   • acetaminophen (TYLENOL) 500 mg, Oral, Every 6 Hours PRN   • aspirin 81 mg, Oral, Daily   • cholecalciferol (VITAMIN D3) 1,000 Units, Oral, As Needed   • hydroCHLOROthiazide (MICROZIDE) 12.5 mg, Oral, Daily   • ibuprofen (ADVIL,MOTRIN) 800 mg, Oral, As Needed   • metoprolol tartrate (LOPRESSOR) 12.5 mg, Oral, 2 Times Daily, Pt takes 1/2 of tablet in morning and 1/2 in the evening.   • PARoxetine (PAXIL) 10 MG tablet As Needed, Has not taken for a month. Reported on 04/28/2023   • vitamin C (ASCORBIC ACID) 250 mg, Oral, Daily         Objective     /78 (BP Location: Right arm, Patient Position: Sitting, Cuff Size: Adult)   Pulse 76   Ht 162.6 cm (64\")   Wt 72.6 kg (160 lb)   SpO2 96%   BMI 27.46 kg/m²       Constitutional:       Appearance: Healthy " appearance.   Eyes:      General: No scleral icterus.  Neck:      Thyroid: No thyroid mass.      Vascular: No carotid bruit or JVD. JVD normal.   Pulmonary:      Effort: Pulmonary effort is normal.      Breath sounds: Normal breath sounds.   Cardiovascular:      Normal rate. Regular rhythm.      Murmurs: There is no murmur.      No gallop.   Skin:     General: Skin is warm. There is no cyanosis.   Neurological:      General: No focal deficit present.      Mental Status: Alert.   Psychiatric:         Attention and Perception: Attention normal.         Result Review  (reviewed with patient):            Labs (4/18/2023):  · WBC 8.2, RBC 3.92, HGB 12.9, HCT 36.5,   · Chol 235, Trig 116, HDL 66,   · Creat 0.91, BUN 20, K 5, Na 141, ALT 14, AST 16      Assessment     Diagnoses and all orders for this visit:    1. Paroxysmal SVT (supraventricular tachycardia) (HCC) (Primary)  Overview:  · 24-hour Holter monitor (5/22/2017): Short episodes of atrial fibrillation  · 72 hour Holter (6/2017): Sinus rhythm with frequent PVCs (PVC burden 19.6%)  · Echo (6/2/2017): EF 55%. Mild TR.  · Pharmacologic nuclear stress (6/5/2017): EF 58%.  Normal perfusion.  Frequent PVCs noted.  · 7 day Holter (2/2018): Sinus rhythm with average heart rate 71 bpm. Short episodes of atrial tachycardia. Occasional PVCs. No atrial fibrillation.  · Chads VASC  1 (female)    Assessment & Plan:  · Asymptomatic   · Continue metoprolol tartrate 12.5 mg twice daily    Orders:  -     metoprolol tartrate (LOPRESSOR) 25 MG tablet; Take 0.5 tablets by mouth 2 (Two) Times a Day. Pt takes 1/2 of tablet in morning and 1/2 in the evening.  Dispense: 90 tablet; Refill: 3    2. Paroxysmal SVT (supraventricular tachycardia)  Overview:  · 24-hour Holter monitor (5/22/2017): Short episodes of atrial fibrillation  · 72 hour Holter (6/2017): Sinus rhythm with frequent PVCs (PVC burden 19.6%)  · Echo (6/2/2017): EF 55%. Mild TR.  · Pharmacologic nuclear stress  (6/5/2017): EF 58%.  Normal perfusion.  Frequent PVCs noted.  · 7 day Holter (2/2018): Sinus rhythm with average heart rate 71 bpm. Short episodes of atrial tachycardia. Occasional PVCs. No atrial fibrillation.  · Chads VASC  1 (female)    Assessment & Plan:  · Asymptomatic   · Continue metoprolol tartrate 12.5 mg twice daily    Orders:  -     metoprolol tartrate (LOPRESSOR) 25 MG tablet; Take 0.5 tablets by mouth 2 (Two) Times a Day. Pt takes 1/2 of tablet in morning and 1/2 in the evening.  Dispense: 90 tablet; Refill: 3    3. Atypical angina  Overview:  · Nonexertional chest pain symptoms  · Pharmacologic nuclear stress (6/5/2017): Normal perfusion.  LVEF 58%.  Frequent PVCs    Assessment & Plan:  · No chest pain symptoms       4. Essential hypertension  Overview:  • Target blood pressure <130/80 mmHg    Assessment & Plan:  · Mildly elevated today  · Start HCTZ 12.5 mg daily    Orders:  -     hydroCHLOROthiazide (MICROZIDE) 12.5 MG capsule; Take 1 capsule by mouth Daily.  Dispense: 90 capsule; Refill: 3    5. Candidate for statin therapy due to risk of future cardiovascular event  Overview:  · Estimated 10-year ASCVD risk 8.2%, 5/6/2022  · Historically statin intolerant    Assessment & Plan:  · Statin intolerant          Plan   • Start HCTZ 12.5 mg daily for hypertension  • Otherwise continue present medical therapy      Follow-up   Return in about 1 year (around 4/28/2024).        Vish Vaughn MD, [facility], [facility]  4/28/2023

## 2023-04-28 ENCOUNTER — OFFICE VISIT (OUTPATIENT)
Dept: CARDIOLOGY | Facility: CLINIC | Age: 68
End: 2023-04-28
Payer: MEDICARE

## 2023-04-28 VITALS
HEIGHT: 64 IN | DIASTOLIC BLOOD PRESSURE: 78 MMHG | HEART RATE: 76 BPM | BODY MASS INDEX: 27.31 KG/M2 | OXYGEN SATURATION: 96 % | SYSTOLIC BLOOD PRESSURE: 138 MMHG | WEIGHT: 160 LBS

## 2023-04-28 DIAGNOSIS — I20.8 ATYPICAL ANGINA: ICD-10-CM

## 2023-04-28 DIAGNOSIS — I47.1 PAROXYSMAL SVT (SUPRAVENTRICULAR TACHYCARDIA): ICD-10-CM

## 2023-04-28 DIAGNOSIS — I47.1 PAROXYSMAL SVT (SUPRAVENTRICULAR TACHYCARDIA): Primary | ICD-10-CM

## 2023-04-28 DIAGNOSIS — I10 ESSENTIAL HYPERTENSION: ICD-10-CM

## 2023-04-28 DIAGNOSIS — Z91.89 CANDIDATE FOR STATIN THERAPY DUE TO RISK OF FUTURE CARDIOVASCULAR EVENT: ICD-10-CM

## 2023-04-28 PROCEDURE — 99214 OFFICE O/P EST MOD 30 MIN: CPT | Performed by: INTERNAL MEDICINE

## 2023-04-28 PROCEDURE — 3078F DIAST BP <80 MM HG: CPT | Performed by: INTERNAL MEDICINE

## 2023-04-28 PROCEDURE — 1159F MED LIST DOCD IN RCRD: CPT | Performed by: INTERNAL MEDICINE

## 2023-04-28 PROCEDURE — 3075F SYST BP GE 130 - 139MM HG: CPT | Performed by: INTERNAL MEDICINE

## 2023-04-28 PROCEDURE — 1160F RVW MEDS BY RX/DR IN RCRD: CPT | Performed by: INTERNAL MEDICINE

## 2023-04-28 RX ORDER — HYDROCHLOROTHIAZIDE 12.5 MG/1
12.5 CAPSULE, GELATIN COATED ORAL DAILY
Qty: 90 CAPSULE | Refills: 3 | Status: SHIPPED | OUTPATIENT
Start: 2023-04-28

## 2024-03-29 DIAGNOSIS — I47.10 PAROXYSMAL SVT (SUPRAVENTRICULAR TACHYCARDIA): ICD-10-CM

## 2024-05-01 NOTE — PROGRESS NOTES
"    Cardiology Outpatient Visit      Identification: Muriel Rainey is a 68 y.o. female who resides in [place], [place].     Reason for visit:  Paroxysmal SVT (supraventricular tachycardia) (HCC)      Subjective      Muriel returns to the office today.  She is doing well with no new cardiovascular complaints.  She is seeing a surgeon next week for some shoulder discomfort.  She describes it as numbness and tingling at the skin level.  She has been referred to see Dr. Lauro Giron regarding this.  She request a EKG in case operation needed    Review of Systems   All other systems reviewed and are negative.      Allergies   Allergen Reactions    Flecainide Other (See Comments)     Expensive    Gabapentin Mental Status Change    Statins Myalgia         Current Outpatient Medications   Medication Instructions    acetaminophen (TYLENOL) 500 mg, Oral, Every 6 Hours PRN    aspirin 81 mg, Oral, Daily    cholecalciferol (VITAMIN D3) 1,000 Units, Oral, As Needed    hydroCHLOROthiazide (MICROZIDE) 12.5 mg, Oral, Daily    ibuprofen (ADVIL,MOTRIN) 800 mg, Oral, As Needed    metoprolol tartrate (LOPRESSOR) 25 MG tablet TAKE ONE-HALF TABLET BY MOUTH EVERY MORNING AND TAKE ONE-HALF TABLET EVERY EVENING    vitamin C (ASCORBIC ACID) 250 mg, Oral, Daily         Objective     /74 (BP Location: Right arm, Patient Position: Sitting, Cuff Size: Adult)   Pulse 58   Ht 160 cm (63\")   Wt 77.6 kg (171 lb)   SpO2 97%   BMI 30.29 kg/m²       Constitutional:       Appearance: Healthy appearance.   Eyes:      General: No scleral icterus.  Neck:      Thyroid: No thyroid mass.      Vascular: No carotid bruit or JVD. JVD normal.   Pulmonary:      Effort: Pulmonary effort is normal.      Breath sounds: Normal breath sounds.   Cardiovascular:      Normal rate. Regular rhythm.      Murmurs: There is no murmur.      No gallop.    Edema:     Peripheral edema absent.   Skin:     General: Skin is warm. There is no cyanosis.   Neurological:      General: " No focal deficit present.      Mental Status: Alert.   Psychiatric:         Attention and Perception: Attention normal.         Result Review  (reviewed with patient):        ECG 12 Lead    Date/Time: 5/3/2024 2:39 PM  Performed by: Nahid Vaughn IV, MD    Authorized by: Nahid Vaughn IV, MD  Comparison: compared with previous ECG from 4/22/2022  Similar to previous ECG  Rhythm: sinus bradycardia  BPM: 56  Other findings: non-specific ST-T wave changes    Clinical impression: non-specific ECG           Assessment     Diagnoses and all orders for this visit:    1. Paroxysmal SVT (supraventricular tachycardia) (Primary)  Overview:  24-hour Holter monitor (5/22/2017): Short episodes of atrial fibrillation  72 hour Holter (6/2017): Sinus rhythm with frequent PVCs (PVC burden 19.6%)  Echo (6/2/2017): EF 55%. Mild TR.  Pharmacologic nuclear stress (6/5/2017): EF 58%.  Normal perfusion.  Frequent PVCs noted.  7 day Holter (2/2018): Sinus rhythm with average heart rate 71 bpm. Short episodes of atrial tachycardia. Occasional PVCs. No atrial fibrillation.  Chads VASC  1 (female)            Plan   No new recommendations  Okay to proceed with surgery if indicated      Follow-up   Return in about 1 year (around 5/3/2025).        Vish Vaughn MD, [facility], [facility]  5/3/2024

## 2024-05-03 ENCOUNTER — OFFICE VISIT (OUTPATIENT)
Dept: CARDIOLOGY | Facility: CLINIC | Age: 69
End: 2024-05-03
Payer: MEDICARE

## 2024-05-03 VITALS
DIASTOLIC BLOOD PRESSURE: 74 MMHG | OXYGEN SATURATION: 97 % | HEIGHT: 63 IN | WEIGHT: 171 LBS | BODY MASS INDEX: 30.3 KG/M2 | SYSTOLIC BLOOD PRESSURE: 138 MMHG | HEART RATE: 58 BPM

## 2024-05-03 DIAGNOSIS — I47.10 PAROXYSMAL SVT (SUPRAVENTRICULAR TACHYCARDIA): Primary | ICD-10-CM

## 2024-05-03 DIAGNOSIS — I20.89 ATYPICAL ANGINA: ICD-10-CM

## 2024-05-03 PROCEDURE — 3078F DIAST BP <80 MM HG: CPT | Performed by: INTERNAL MEDICINE

## 2024-05-03 PROCEDURE — 1159F MED LIST DOCD IN RCRD: CPT | Performed by: INTERNAL MEDICINE

## 2024-05-03 PROCEDURE — 1160F RVW MEDS BY RX/DR IN RCRD: CPT | Performed by: INTERNAL MEDICINE

## 2024-05-03 PROCEDURE — 3075F SYST BP GE 130 - 139MM HG: CPT | Performed by: INTERNAL MEDICINE

## 2024-05-03 PROCEDURE — 93000 ELECTROCARDIOGRAM COMPLETE: CPT | Performed by: INTERNAL MEDICINE

## 2024-05-03 PROCEDURE — 99214 OFFICE O/P EST MOD 30 MIN: CPT | Performed by: INTERNAL MEDICINE

## 2024-05-03 NOTE — LETTER
May 3, 2024     Dmitriy Keating MD  [address]  [address]  [facility] [id number]    Patient: Muriel Rainey   YOB: 1955   Date of Visit: 5/3/2024     Dear Dmitriy Keating MD:       Thank you for referring Muriel Rainey to me for evaluation. Below are the relevant portions of my assessment and plan of care.    If you have questions, please do not hesitate to call me. I look forward to following Muriel along with you.         Sincerely,        Nahid Vaughn IV, MD        CC: MD Roderick Cervantes, Nahid GREEN IV, MD  05/03/24 [id number]  Signed      Cardiology Outpatient Visit      Identification: Muriel Rainey is a 68 y.o. female who resides in [place], [place].     Reason for visit:  Paroxysmal SVT (supraventricular tachycardia) (HCC)      Subjective     Muriel returns to the office today.  She is doing well with no new cardiovascular complaints.  She is seeing a surgeon next week for some shoulder discomfort.  She describes it as numbness and tingling at the skin level.  She has been referred to see Dr. Lauro Giron regarding this.  She request a EKG in case operation needed    Review of Systems   All other systems reviewed and are negative.      Allergies   Allergen Reactions   • Flecainide Other (See Comments)     Expensive   • Gabapentin Mental Status Change   • Statins Myalgia         Current Outpatient Medications   Medication Instructions   • acetaminophen (TYLENOL) 500 mg, Oral, Every 6 Hours PRN   • aspirin 81 mg, Oral, Daily   • cholecalciferol (VITAMIN D3) 1,000 Units, Oral, As Needed   • hydroCHLOROthiazide (MICROZIDE) 12.5 mg, Oral, Daily   • ibuprofen (ADVIL,MOTRIN) 800 mg, Oral, As Needed   • metoprolol tartrate (LOPRESSOR) 25 MG tablet TAKE ONE-HALF TABLET BY MOUTH EVERY MORNING AND TAKE ONE-HALF TABLET EVERY EVENING   • vitamin C (ASCORBIC ACID) 250 mg, Oral, Daily         Objective    /74 (BP Location: Right arm, Patient Position: Sitting, Cuff Size: Adult)    "Pulse 58   Ht 160 cm (63\")   Wt 77.6 kg (171 lb)   SpO2 97%   BMI 30.29 kg/m²       Constitutional:       Appearance: Healthy appearance.   Eyes:      General: No scleral icterus.  Neck:      Thyroid: No thyroid mass.      Vascular: No carotid bruit or JVD. JVD normal.   Pulmonary:      Effort: Pulmonary effort is normal.      Breath sounds: Normal breath sounds.   Cardiovascular:      Normal rate. Regular rhythm.      Murmurs: There is no murmur.      No gallop.    Edema:     Peripheral edema absent.   Skin:     General: Skin is warm. There is no cyanosis.   Neurological:      General: No focal deficit present.      Mental Status: Alert.   Psychiatric:         Attention and Perception: Attention normal.         Result Review (reviewed with patient):        ECG 12 Lead    Date/Time: 5/3/2024 2:39 PM  Performed by: Nahid Vaughn IV, MD    Authorized by: Nahid Vaughn IV, MD  Comparison: compared with previous ECG from 4/22/2022  Similar to previous ECG  Rhythm: sinus bradycardia  BPM: 56  Other findings: non-specific ST-T wave changes    Clinical impression: non-specific ECG           Assessment    Diagnoses and all orders for this visit:    1. Paroxysmal SVT (supraventricular tachycardia) (Primary)  Overview:  24-hour Holter monitor (5/22/2017): Short episodes of atrial fibrillation  72 hour Holter (6/2017): Sinus rhythm with frequent PVCs (PVC burden 19.6%)  Echo (6/2/2017): EF 55%. Mild TR.  Pharmacologic nuclear stress (6/5/2017): EF 58%.  Normal perfusion.  Frequent PVCs noted.  7 day Holter (2/2018): Sinus rhythm with average heart rate 71 bpm. Short episodes of atrial tachycardia. Occasional PVCs. No atrial fibrillation.  Chads VASC  1 (female)            Plan  No new recommendations  Okay to proceed with surgery if indicated      Follow-up   Return in about 1 year (around 5/3/2025).        Vish Vaughn MD, [facility], [facility]  5/3/2024   "

## 2024-12-27 ENCOUNTER — HOSPITAL ENCOUNTER (OUTPATIENT)
Dept: GENERAL RADIOLOGY | Facility: HOSPITAL | Age: 69
Discharge: HOME | End: 2024-12-27
Payer: MEDICARE

## 2024-12-27 ENCOUNTER — TRANSCRIBE ORDERS (OUTPATIENT)
Dept: ADMINISTRATIVE | Facility: HOSPITAL | Age: 69
End: 2024-12-27
Payer: MEDICARE

## 2024-12-27 DIAGNOSIS — R10.31 RT GROIN PAIN: Primary | ICD-10-CM

## 2024-12-27 PROCEDURE — 73502 X-RAY EXAM HIP UNI 2-3 VIEWS: CPT

## 2025-01-23 ENCOUNTER — TELEPHONE (OUTPATIENT)
Dept: CARDIOLOGY | Facility: CLINIC | Age: 70
End: 2025-01-23
Payer: MEDICARE

## 2025-01-23 NOTE — TELEPHONE ENCOUNTER
[facility] is requesting cardiac clearance for an upcoming L2-4 decompression, fusion with right TLIF with Dr. Zoey Khoury scheduled 2/13/2025.    Echo (6/2/2017): EF 55%. Mild TR.  Pharmacologic nuclear stress (6/5/2017): EF 58%.  Normal perfusion.  Frequent PVCs noted.  7 day Holter (2/2018): Sinus rhythm with average heart rate 71 bpm. Short episodes of atrial tachycardia. Occasional PVCs. No atrial fibrillation.    You had cleared her for a different surgery during her last office visit 5/2024. Just needing updated clearance. She is on ASA daily.     Okay to proceed?

## 2025-01-23 NOTE — LETTER
01/23/25      Doctor - Zoey Khoury  Fax # - [phone]       Re: Muriel Rainey, 1955   Procedure/Surgery - L2-4 decompression, fusion with right TLIF         Dear Dr. Khoury,     Muriel Rainey, 1955 may proceed with a scheduled procedure/surgery from a cardiac/EP standpoint.  Any questions please call [phone].    [x]  Continue Aspirin 81 mg daily              Sincerely,          Nahid Vaughn IV, MD

## 2025-01-23 NOTE — TELEPHONE ENCOUNTER
If she is having no chest pain symptoms, proceed on low-dose aspirin, metoprolol.    MARK Vaughn MD [facility], [facility]  Interventional and General Cardiology

## 2025-03-21 DIAGNOSIS — I47.10 PAROXYSMAL SVT (SUPRAVENTRICULAR TACHYCARDIA): ICD-10-CM

## 2025-03-21 RX ORDER — METOPROLOL TARTRATE 25 MG/1
TABLET, FILM COATED ORAL
Qty: 90 TABLET | Refills: 3 | Status: SHIPPED | OUTPATIENT
Start: 2025-03-21